# Patient Record
Sex: MALE | Race: BLACK OR AFRICAN AMERICAN | NOT HISPANIC OR LATINO | Employment: FULL TIME | URBAN - METROPOLITAN AREA
[De-identification: names, ages, dates, MRNs, and addresses within clinical notes are randomized per-mention and may not be internally consistent; named-entity substitution may affect disease eponyms.]

---

## 2017-01-24 ENCOUNTER — HOSPITAL ENCOUNTER (OUTPATIENT)
Dept: OTHER | Facility: HOSPITAL | Age: 62
Setting detail: SPECIMEN
Discharge: HOME | End: 2017-01-24
Attending: UROLOGY | Admitting: UROLOGY

## 2017-09-01 ENCOUNTER — APPOINTMENT (OUTPATIENT)
Dept: GENERAL RADIOLOGY | Facility: HOSPITAL | Age: 62
End: 2017-09-01

## 2017-09-01 ENCOUNTER — HOSPITAL ENCOUNTER (OUTPATIENT)
Dept: CARDIOLOGY | Facility: HOSPITAL | Age: 62
Discharge: HOME | End: 2017-09-01
Attending: INTERNAL MEDICINE

## 2017-09-01 ENCOUNTER — HOSPITAL ENCOUNTER (EMERGENCY)
Facility: HOSPITAL | Age: 62
Discharge: HOME | End: 2017-09-01
Attending: EMERGENCY MEDICINE | Admitting: EMERGENCY MEDICINE

## 2017-09-01 VITALS
WEIGHT: 170 LBS | DIASTOLIC BLOOD PRESSURE: 83 MMHG | RESPIRATION RATE: 16 BRPM | HEART RATE: 55 BPM | HEIGHT: 65 IN | BODY MASS INDEX: 28.32 KG/M2 | SYSTOLIC BLOOD PRESSURE: 126 MMHG | TEMPERATURE: 97.5 F | OXYGEN SATURATION: 94 %

## 2017-09-01 DIAGNOSIS — R07.9 CHEST PAIN, UNSPECIFIED TYPE: Primary | ICD-10-CM

## 2017-09-01 DIAGNOSIS — R07.2 PRECORDIAL PAIN: Primary | ICD-10-CM

## 2017-09-01 DIAGNOSIS — R07.2 PRECORDIAL PAIN: ICD-10-CM

## 2017-09-01 LAB
ALBUMIN SERPL-MCNC: 4.1 G/DL (ref 3.5–5.2)
ALBUMIN/GLOB SERPL: 1.1 G/DL
ALP SERPL-CCNC: 66 U/L (ref 39–117)
ALT SERPL W P-5'-P-CCNC: 19 U/L (ref 1–41)
ANION GAP SERPL CALCULATED.3IONS-SCNC: 16.9 MMOL/L
AST SERPL-CCNC: 25 U/L (ref 1–40)
BASOPHILS # BLD AUTO: 0.04 10*3/MM3 (ref 0–0.2)
BASOPHILS NFR BLD AUTO: 0.6 % (ref 0–1.5)
BH CV ECHO MEAS - ACS: 1.7 CM
BH CV ECHO MEAS - AO MAX PG: 10.4 MMHG
BH CV ECHO MEAS - AO ROOT AREA (BSA CORRECTED): 1.6
BH CV ECHO MEAS - AO ROOT AREA: 6.8 CM^2
BH CV ECHO MEAS - AO ROOT DIAM: 2.9 CM
BH CV ECHO MEAS - AO V2 MAX: 161 CM/SEC
BH CV ECHO MEAS - BSA(HAYCOCK): 1.9 M^2
BH CV ECHO MEAS - BSA: 1.8 M^2
BH CV ECHO MEAS - BZI_BMI: 28.3 KILOGRAMS/M^2
BH CV ECHO MEAS - BZI_METRIC_HEIGHT: 165.1 CM
BH CV ECHO MEAS - BZI_METRIC_WEIGHT: 77.1 KG
BH CV ECHO MEAS - CONTRAST EF 4CH: 71.4 ML/M^2
BH CV ECHO MEAS - EDV(MOD-SP4): 56 ML
BH CV ECHO MEAS - EDV(TEICH): 108.8 ML
BH CV ECHO MEAS - EF(CUBED): 73.6 %
BH CV ECHO MEAS - EF(MOD-SP4): 64 %
BH CV ECHO MEAS - EF(TEICH): 65.3 %
BH CV ECHO MEAS - ESV(MOD-SP4): 16 ML
BH CV ECHO MEAS - ESV(TEICH): 37.8 ML
BH CV ECHO MEAS - FS: 35.9 %
BH CV ECHO MEAS - IVS/LVPW: 1
BH CV ECHO MEAS - IVSD: 1.1 CM
BH CV ECHO MEAS - LAT PEAK E' VEL: 10 CM/SEC
BH CV ECHO MEAS - LV DIASTOLIC VOL/BSA (35-75): 30.3 ML/M^2
BH CV ECHO MEAS - LV MASS(C)D: 183 GRAMS
BH CV ECHO MEAS - LV MASS(C)DI: 99.1 GRAMS/M^2
BH CV ECHO MEAS - LV SYSTOLIC VOL/BSA (12-30): 8.7 ML/M^2
BH CV ECHO MEAS - LVIDD: 4.8 CM
BH CV ECHO MEAS - LVIDS: 3.1 CM
BH CV ECHO MEAS - LVLD AP4: 7.7 CM
BH CV ECHO MEAS - LVLS AP4: 6.2 CM
BH CV ECHO MEAS - LVPWD: 1 CM
BH CV ECHO MEAS - MED PEAK E' VEL: 9 CM/SEC
BH CV ECHO MEAS - MR MAX PG: 42.1 MMHG
BH CV ECHO MEAS - MR MAX VEL: 324.4 CM/SEC
BH CV ECHO MEAS - MV A DUR: 0.13 SEC
BH CV ECHO MEAS - MV A MAX VEL: 120.3 CM/SEC
BH CV ECHO MEAS - MV DEC SLOPE: 481 CM/SEC^2
BH CV ECHO MEAS - MV DEC TIME: 0.21 SEC
BH CV ECHO MEAS - MV E MAX VEL: 99.4 CM/SEC
BH CV ECHO MEAS - MV E/A: 0.83
BH CV ECHO MEAS - MV P1/2T MAX VEL: 99.4 CM/SEC
BH CV ECHO MEAS - MV P1/2T: 60.6 MSEC
BH CV ECHO MEAS - MVA P1/2T LCG: 2.2 CM^2
BH CV ECHO MEAS - MVA(P1/2T): 3.6 CM^2
BH CV ECHO MEAS - PULM A REVS DUR: 0.11 SEC
BH CV ECHO MEAS - PULM A REVS VEL: 38.3 CM/SEC
BH CV ECHO MEAS - PULM DIAS VEL: 42.2 CM/SEC
BH CV ECHO MEAS - PULM S/D: 1.6
BH CV ECHO MEAS - PULM SYS VEL: 68.9 CM/SEC
BH CV ECHO MEAS - SI(CUBED): 44.8 ML/M^2
BH CV ECHO MEAS - SI(MOD-SP4): 21.7 ML/M^2
BH CV ECHO MEAS - SI(TEICH): 38.5 ML/M^2
BH CV ECHO MEAS - SV(CUBED): 82.7 ML
BH CV ECHO MEAS - SV(MOD-SP4): 40 ML
BH CV ECHO MEAS - SV(TEICH): 71.1 ML
BH CV ECHO MEAS - TAPSE (>1.6): 2 CM2
BH CV ECHO MEAS - TR MAX VEL: 291.8 CM/SEC
BH CV STRESS BP STAGE 1: NORMAL
BH CV STRESS BP STAGE 2: NORMAL
BH CV STRESS DURATION MIN STAGE 1: 3
BH CV STRESS DURATION MIN STAGE 2: 3
BH CV STRESS DURATION MIN STAGE 3: 1
BH CV STRESS DURATION SEC STAGE 1: 0
BH CV STRESS DURATION SEC STAGE 2: 0
BH CV STRESS DURATION SEC STAGE 3: 0
BH CV STRESS ECHO POST STRESS EJECTION FRACTION EF: 72 %
BH CV STRESS GRADE STAGE 1: 10
BH CV STRESS GRADE STAGE 2: 12
BH CV STRESS GRADE STAGE 3: 14
BH CV STRESS HR STAGE 1: 88
BH CV STRESS HR STAGE 2: 122
BH CV STRESS HR STAGE 3: 146
BH CV STRESS METS STAGE 1: 5
BH CV STRESS METS STAGE 2: 7.5
BH CV STRESS METS STAGE 3: 10
BH CV STRESS PROTOCOL 1: NORMAL
BH CV STRESS SPEED STAGE 1: 1.7
BH CV STRESS SPEED STAGE 2: 2.5
BH CV STRESS SPEED STAGE 3: 3.4
BH CV STRESS STAGE 1: 1
BH CV STRESS STAGE 2: 2
BH CV STRESS STAGE 3: 3
BH CV XLRA - RV BASE: 2.6 CM
BH CV XLRA - TDI S': 13 CM/SEC
BILIRUB SERPL-MCNC: 0.8 MG/DL (ref 0.1–1.2)
BUN BLD-MCNC: 13 MG/DL (ref 8–23)
BUN/CREAT SERPL: 11.2 (ref 7–25)
CALCIUM SPEC-SCNC: 9.4 MG/DL (ref 8.6–10.5)
CHLORIDE SERPL-SCNC: 102 MMOL/L (ref 98–107)
CO2 SERPL-SCNC: 19.1 MMOL/L (ref 22–29)
CREAT BLD-MCNC: 1.16 MG/DL (ref 0.76–1.27)
DEPRECATED RDW RBC AUTO: 45.6 FL (ref 37–54)
E/E' RATIO: 10.5
EOSINOPHIL # BLD AUTO: 0.1 10*3/MM3 (ref 0–0.7)
EOSINOPHIL NFR BLD AUTO: 1.5 % (ref 0.3–6.2)
ERYTHROCYTE [DISTWIDTH] IN BLOOD BY AUTOMATED COUNT: 15.4 % (ref 11.5–14.5)
GFR SERPL CREATININE-BSD FRML MDRD: 77 ML/MIN/1.73
GLOBULIN UR ELPH-MCNC: 3.8 GM/DL
GLUCOSE BLD-MCNC: 123 MG/DL (ref 65–99)
HCT VFR BLD AUTO: 43.2 % (ref 40.4–52.2)
HGB BLD-MCNC: 14.7 G/DL (ref 13.7–17.6)
IMM GRANULOCYTES # BLD: 0 10*3/MM3 (ref 0–0.03)
IMM GRANULOCYTES NFR BLD: 0 % (ref 0–0.5)
INR PPP: 1.03 (ref 0.9–1.1)
LEFT ATRIUM VOLUME INDEX: 24 ML/M2
LV EF 2D ECHO EST: 64 %
LYMPHOCYTES # BLD AUTO: 1.99 10*3/MM3 (ref 0.9–4.8)
LYMPHOCYTES NFR BLD AUTO: 29.1 % (ref 19.6–45.3)
MAXIMAL PREDICTED HEART RATE: 158 BPM
MCH RBC QN AUTO: 27.9 PG (ref 27–32.7)
MCHC RBC AUTO-ENTMCNC: 34 G/DL (ref 32.6–36.4)
MCV RBC AUTO: 82.1 FL (ref 79.8–96.2)
MONOCYTES # BLD AUTO: 0.46 10*3/MM3 (ref 0.2–1.2)
MONOCYTES NFR BLD AUTO: 6.7 % (ref 5–12)
NEUTROPHILS # BLD AUTO: 4.24 10*3/MM3 (ref 1.9–8.1)
NEUTROPHILS NFR BLD AUTO: 62.1 % (ref 42.7–76)
NRBC BLD MANUAL-RTO: 0 /100 WBC (ref 0–0)
PERCENT MAX PREDICTED HR: 92.41 %
PLAT MORPH BLD: NORMAL
PLATELET # BLD AUTO: 130 10*3/MM3 (ref 140–500)
PMV BLD AUTO: 10.8 FL (ref 6–12)
POTASSIUM BLD-SCNC: 4.4 MMOL/L (ref 3.5–5.2)
PROT SERPL-MCNC: 7.9 G/DL (ref 6–8.5)
PROTHROMBIN TIME: 13.1 SECONDS (ref 11.7–14.2)
RBC # BLD AUTO: 5.26 10*6/MM3 (ref 4.6–6)
RBC MORPH BLD: NORMAL
SODIUM BLD-SCNC: 138 MMOL/L (ref 136–145)
STRESS BASELINE BP: NORMAL MMHG
STRESS BASELINE HR: 55 BPM
STRESS PERCENT HR: 109 %
STRESS POST ESTIMATED WORKLOAD: 8 METS
STRESS POST EXERCISE DUR MIN: 7 MIN
STRESS POST EXERCISE DUR SEC: 0 SEC
STRESS POST PEAK BP: NORMAL MMHG
STRESS POST PEAK HR: 146 BPM
STRESS TARGET HR: 134 BPM
TROPONIN T SERPL-MCNC: <0.01 NG/ML (ref 0–0.03)
WBC MORPH BLD: NORMAL
WBC NRBC COR # BLD: 6.83 10*3/MM3 (ref 4.5–10.7)

## 2017-09-01 PROCEDURE — 85025 COMPLETE CBC W/AUTO DIFF WBC: CPT | Performed by: EMERGENCY MEDICINE

## 2017-09-01 PROCEDURE — 84484 ASSAY OF TROPONIN QUANT: CPT | Performed by: EMERGENCY MEDICINE

## 2017-09-01 PROCEDURE — 93325 DOPPLER ECHO COLOR FLOW MAPG: CPT

## 2017-09-01 PROCEDURE — 93016 CV STRESS TEST SUPVJ ONLY: CPT | Performed by: INTERNAL MEDICINE

## 2017-09-01 PROCEDURE — 85610 PROTHROMBIN TIME: CPT | Performed by: EMERGENCY MEDICINE

## 2017-09-01 PROCEDURE — 71020 HC CHEST PA AND LATERAL: CPT

## 2017-09-01 PROCEDURE — 93350 STRESS TTE ONLY: CPT

## 2017-09-01 PROCEDURE — 80053 COMPREHEN METABOLIC PANEL: CPT | Performed by: EMERGENCY MEDICINE

## 2017-09-01 PROCEDURE — 93350 STRESS TTE ONLY: CPT | Performed by: INTERNAL MEDICINE

## 2017-09-01 PROCEDURE — 93320 DOPPLER ECHO COMPLETE: CPT | Performed by: INTERNAL MEDICINE

## 2017-09-01 PROCEDURE — 93325 DOPPLER ECHO COLOR FLOW MAPG: CPT | Performed by: INTERNAL MEDICINE

## 2017-09-01 PROCEDURE — 99284 EMERGENCY DEPT VISIT MOD MDM: CPT | Performed by: INTERNAL MEDICINE

## 2017-09-01 PROCEDURE — 85007 BL SMEAR W/DIFF WBC COUNT: CPT | Performed by: EMERGENCY MEDICINE

## 2017-09-01 PROCEDURE — 93320 DOPPLER ECHO COMPLETE: CPT

## 2017-09-01 PROCEDURE — 93018 CV STRESS TEST I&R ONLY: CPT | Performed by: INTERNAL MEDICINE

## 2017-09-01 PROCEDURE — 99285 EMERGENCY DEPT VISIT HI MDM: CPT

## 2017-09-01 PROCEDURE — 93005 ELECTROCARDIOGRAM TRACING: CPT

## 2017-09-01 PROCEDURE — 93017 CV STRESS TEST TRACING ONLY: CPT

## 2017-09-01 PROCEDURE — 93010 ELECTROCARDIOGRAM REPORT: CPT | Performed by: INTERNAL MEDICINE

## 2017-09-01 RX ORDER — NITROGLYCERIN 0.4 MG/1
0.4 TABLET SUBLINGUAL
Status: DISCONTINUED | OUTPATIENT
Start: 2017-09-01 | End: 2017-09-01 | Stop reason: HOSPADM

## 2017-09-01 RX ORDER — SODIUM CHLORIDE 0.9 % (FLUSH) 0.9 %
10 SYRINGE (ML) INJECTION AS NEEDED
Status: DISCONTINUED | OUTPATIENT
Start: 2017-09-01 | End: 2017-09-01 | Stop reason: HOSPADM

## 2017-09-01 RX ORDER — ASPIRIN 81 MG/1
324 TABLET, CHEWABLE ORAL ONCE
Status: COMPLETED | OUTPATIENT
Start: 2017-09-01 | End: 2017-09-01

## 2017-09-01 RX ORDER — SULFAMETHOXAZOLE AND TRIMETHOPRIM 800; 160 MG/1; MG/1
1 TABLET ORAL 2 TIMES DAILY
COMMUNITY
End: 2017-09-13

## 2017-09-01 RX ORDER — OXYBUTYNIN CHLORIDE 5 MG/1
5 TABLET ORAL DAILY
COMMUNITY
End: 2020-05-19 | Stop reason: SDUPTHER

## 2017-09-01 RX ADMIN — NITROGLYCERIN 0.4 MG: 0.4 TABLET SUBLINGUAL at 08:43

## 2017-09-01 RX ADMIN — ASPIRIN 81 MG 324 MG: 81 TABLET ORAL at 08:36

## 2017-09-01 RX ADMIN — NITROGLYCERIN 0.4 MG: 0.4 TABLET SUBLINGUAL at 08:36

## 2017-09-01 NOTE — H&P
Patient Name: Chet Boothe  :1955  62 y.o.    Date of Admission: 2017  Date of Consultation:  17  Encounter Provider: Kerry Mary RN  Place of Service: [facility]  Referring Provider: No ref. provider found  Patient Care Team:  Luiz Morrison MD as PCP - General      Chief complaint: chest pain    History of Present Illness:.   62-year-old -American male usually is followed by Dr. Morrison.  Patient presents to the emergency room complaining of chest discomfort.  He says it started in the front of his chest and radiated directly into his back.  He said actually started about 2 weeks ago.  He's been having intermittent episodes since then.  Usually he says he sits down it goes away in several minutes but it does not always occur with exertion.  He has no associated symptoms of shortness of breath diaphoresis nausea.  He came emergency room today because last night it persisted and did not go away.  He said nitroglycerin did help it was a stabbing sensation.  Now he is down to 1 out of 10 x-ray comfortable resting comfortably.  To the best of my knowledge and per the patient's report he did not continue the morphine.  He was seen for further evaluation    Past Medical History:   Diagnosis Date   • Multiple lipomas    • Prostate CA        Past Surgical History:   Procedure Laterality Date   • EXCISION MASS TRUNK Bilateral 3/18/2016    Procedure: LIPOMA EXCISION, bilateral upper extremities and right flank region ;  Surgeon: Marco Lopez MD;  Location: [facility] OR [facility] [place];  Service:    • INGUINAL HERNIA REPAIR Right    • LIPOMA EXCISION      TWICE         Prior to Admission medications    Medication Sig Start Date End Date Taking? Authorizing Provider   cetirizine (ZyrTEC) 10 MG tablet Take 10 mg by mouth 2 (two) times a day as needed for allergies.   Yes Historical Provider, MD   HYDROcodone-acetaminophen (NORCO) 5-325 MG per tablet Take 2  tablets by mouth every 6 (six) hours as needed (1-2 tabs prn) for up to 25 doses. 9/16/16  Yes Luiz Morrison MD   oxybutynin (DITROPAN) 5 MG tablet Take 5 mg by mouth Daily.   Yes Historical Provider, MD   sulfamethoxazole-trimethoprim (BACTRIM DS,SEPTRA DS) 800-160 MG per tablet Take 1 tablet by mouth 2 (Two) Times a Day.   Yes Historical Provider, MD       No Known Allergies    Social History     Social History   • Marital status:      Spouse name: N/A   • Number of children: N/A   • Years of education: N/A     Social History Main Topics   • Smoking status: Never Smoker   • Smokeless tobacco: None   • Alcohol use No   • Drug use: No   • Sexual activity: Defer     Other Topics Concern   • None     Social History Narrative   • None       History reviewed. No pertinent family history.    REVIEW OF SYSTEMS:   All systems reviewed.  Pertinent positives identified in HPI.  All other systems are negative.      Objective:     Vitals:    09/01/17 0836 09/01/17 0841 09/01/17 0849 09/01/17 0859   BP: 139/89 133/86 133/80 115/72   Pulse: 54 53 51 53   Resp: 18 16 16 16   Temp:       SpO2: 99% 98% 99% 93%   Weight:       Height:         Body mass index is 28.29 kg/(m^2).    General Appearance:    Alert, cooperative, in no acute distress   Head:    Normocephalic, without obvious abnormality, atraumatic   Eyes:            Lids and lashes normal, conjunctivae and sclerae normal, no   icterus, no pallor, corneas clear, PERRLA   Ears:    Ears appear intact with no abnormalities noted   Throat:   No oral lesions, no thrush, oral mucosa moist   Neck:   No adenopathy, supple, trachea midline, no thyromegaly, no   carotid bruit, no JVD   Back:     No kyphosis present, no scoliosis present, no skin lesions, erythema or scars, no tenderness to percussion or palpation, range of motion normal   Lungs:     Clear to auscultation,respirations regular, even and unlabored    Heart:    Regular rhythm and normal rate, normal S1 and S2,  no murmur, no gallop, no rub, no click   Chest Wall:    No abnormalities observed   Abdomen:     Normal bowel sounds, no masses, no organomegaly, soft        non-tender, non-distended, no guarding, no rebound  tenderness   Extremities:   Moves all extremities well, no edema, no cyanosis, no redness   Pulses:   Pulses palpable and equal bilaterally. Normal radial, carotid, femoral, dorsalis pedis and posterior tibial pulses bilaterally. Normal abdominal aorta   Skin:  Psychiatric:   No bleeding, bruising or rash    Alert and oriented x 3, normal mood and affect   Lab Review:       Results from last 7 days  Lab Units 09/01/17  0708   SODIUM mmol/L 138   POTASSIUM mmol/L 4.4   CHLORIDE mmol/L 102   CO2 mmol/L 19.1*   BUN mg/dL 13   CREATININE mg/dL 1.16   CALCIUM mg/dL 9.4   BILIRUBIN mg/dL 0.8   ALK PHOS U/L 66   ALT (SGPT) U/L 19   AST (SGOT) U/L 25   GLUCOSE mg/dL 123*       Results from last 7 days  Lab Units 09/01/17  0708   TROPONIN T ng/mL <0.010       Results from last 7 days  Lab Units 09/01/17  0708   WBC 10*3/mm3 6.83   HEMOGLOBIN g/dL 14.7   HEMATOCRIT % 43.2   PLATELETS 10*3/mm3 130*       Results from last 7 days  Lab Units 09/01/17  0708   INR  1.03     Previous EKG 3/15/16                           I personally viewed and interpreted the patient's EKG/Telemetry data.        Assessment and Plan:    1.  Atypical chest discomfort.  There are obviously parts of the story that are very concerning the fact it's been recurrent over the past 2 weeks.  Has not occurred with exertion it is been random in nature.  Nitroglycerin did help but as I mentioned to the patient can also help with esophageal spasms.  He does not have a history of hypertension his blood pressure is stable.  His chest x-ray did not show any widening of the mediastinum and his EKG as noted above shows some nonspecific ST segment changes.  In light of that I'm when a start with a stress echocardiogram in my office.  If the stress echo is  unremarkable I'll place him on a proton pump in a better this could represent a esophageal spasm.  He does deny reflux symptoms he also denies any types a recent travels and denies shortness of breath.      Kerry Mary RN  09/01/17  9:44 AM    Jamie Redd MD

## 2017-09-01 NOTE — ED PROVIDER NOTES
EMERGENCY DEPARTMENT ENCOUNTER    CHIEF COMPLAINT  Chief Complaint: chest pain  History given by: pt  History limited by: N/A  Room Number:   PMD: Luiz Morrison MD      HPI:  Pt is a 62 y.o. male who presents complaining of sharp constant central chest pain that began at approximately 0000 last night. Pt claims the pain was a 9/10 when it began and is currently a 5/10. PT states he was driving when the pain began; pt denies doing any exertional activity that caused onset. Pt also c/o epigastric abdominal pain, dry cough, and dysuria. Pt has a hx of local prostate cancer. Pt denies a family hx of heart problems.       Duration/Onset/Timin day   Location: central chest  Radiation: None reported   Quality: sharp constant   Intensity/Severity: currently a 5/10, was a 9/10 when pain began   Associated Symptoms: epigastric abdominal pain, dry cough, dysuria   Aggravating or Alleviating Factors: None reported   Previous Episodes: None reported.       PAST MEDICAL HISTORY  Active Ambulatory Problems     Diagnosis Date Noted   • Multiple lipomas    • Pain medication agreement signed 2016     Resolved Ambulatory Problems     Diagnosis Date Noted   • No Resolved Ambulatory Problems     Past Medical History:   Diagnosis Date   • Multiple lipomas    • Prostate CA        PAST SURGICAL HISTORY  Past Surgical History:   Procedure Laterality Date   • EXCISION MASS TRUNK Bilateral 3/18/2016    Procedure: LIPOMA EXCISION, bilateral upper extremities and right flank region ;  Surgeon: Marco Lopez MD;  Location: [facility] OR [facility] [place];  Service:    • INGUINAL HERNIA REPAIR Right    • LIPOMA EXCISION      TWICE       FAMILY HISTORY  History reviewed. No pertinent family history.    SOCIAL HISTORY  Social History     Social History   • Marital status:      Spouse name: N/A   • Number of children: N/A   • Years of education: N/A     Occupational History   • Not on file.     Social History Main Topics   • Smoking  status: Never Smoker   • Smokeless tobacco: Not on file   • Alcohol use No   • Drug use: No   • Sexual activity: Defer     Other Topics Concern   • Not on file     Social History Narrative   • No narrative on file       ALLERGIES  Review of patient's allergies indicates no known allergies.    REVIEW OF SYSTEMS  Review of Systems   Constitutional: Negative.  Negative for chills and fever.   HENT: Negative.  Negative for sore throat.    Eyes: Negative.    Respiratory: Positive for cough (dry ). Negative for shortness of breath.    Cardiovascular: Positive for chest pain. Negative for leg swelling.   Gastrointestinal: Positive for abdominal pain (epigastric dull). Negative for diarrhea, nausea and vomiting.   Genitourinary: Positive for dysuria.   Musculoskeletal: Negative.  Negative for back pain.   Skin: Negative.  Negative for rash.   Neurological: Negative.  Negative for headaches.       PHYSICAL EXAM  ED Triage Vitals   Temp Heart Rate Resp BP SpO2   09/01/17 0601 09/01/17 0601 09/01/17 0603 09/01/17 0603 09/01/17 0601   97.6 °F (36.4 °C) 65 20 189/88 100 %      Temp src Heart Rate Source Patient Position BP Location FiO2 (%)   -- -- -- -- --              Physical Exam   Constitutional: No distress.   HENT:   Head: Normocephalic and atraumatic.   Mouth/Throat: Oropharynx is clear and moist.   Eyes:   Unremarkable   Cardiovascular: Normal rate and regular rhythm.    No murmur heard.  Pulmonary/Chest: Effort normal and breath sounds normal. No respiratory distress. He exhibits tenderness (to palpation mid-sternum ).   Abdominal: There is no tenderness.   Musculoskeletal: He exhibits no edema or tenderness (no lower extremity pain ).   Neurological: He is alert.   Skin: No rash noted.   Nursing note and vitals reviewed.      LAB RESULTS  Lab Results (last 24 hours)     Procedure Component Value Units Date/Time    CBC & Differential [[id number]] Collected:  09/01/17 0708    Specimen:  Blood Updated:  09/01/17 0802     Narrative:       The following orders were created for panel order CBC & Differential.  Procedure                               Abnormality         Status                     ---------                               -----------         ------                     Scan Slide[[id number]]                   Normal              Final result               CBC Auto Differential[[id number]]        Abnormal            Final result                 Please view results for these tests on the individual orders.    Comprehensive Metabolic Panel [[id number]]  (Abnormal) Collected:  09/01/17 0708    Specimen:  Blood Updated:  09/01/17 0816     Glucose 123 (H) mg/dL      BUN 13 mg/dL      Creatinine 1.16 mg/dL      Sodium 138 mmol/L      Potassium 4.4 mmol/L       Specimen hemolyzed.  Results may be affected. Release stat results on hemolyzed specimen per Dr. Wade.        Chloride 102 mmol/L      CO2 19.1 (L) mmol/L      Calcium 9.4 mg/dL      Total Protein 7.9 g/dL      Albumin 4.10 g/dL      ALT (SGPT) 19 U/L       Specimen hemolyzed.  Results may be affected.        AST (SGOT) 25 U/L       Specimen hemolyzed.  Results may be affected.        Alkaline Phosphatase 66 U/L      Total Bilirubin 0.8 mg/dL      eGFR   Amer 77 mL/min/1.73      Globulin 3.8 gm/dL      A/G Ratio 1.1 g/dL      BUN/Creatinine Ratio 11.2     Anion Gap 16.9 mmol/L     Protime-INR [[id number]]  (Normal) Collected:  09/01/17 0708    Specimen:  Blood Updated:  09/01/17 0748     Protime 13.1 Seconds      INR 1.03    Troponin [[id number]]  (Normal) Collected:  09/01/17 0708    Specimen:  Blood Updated:  09/01/17 0816     Troponin T <0.010 ng/mL       Specimen hemolyzed.  Results may be affected. Release stat results on hemolyzed specimen per Dr. Wade.       Narrative:       Troponin T Reference Ranges:  Less than 0.03 ng/mL:    Negative for AMI  0.03 to 0.09 ng/mL:      Indeterminant for AMI  Greater than 0.09 ng/mL: Positive for AMI    CBC Auto Differential  [[id number]]  (Abnormal) Collected:  09/01/17 0708    Specimen:  Blood Updated:  09/01/17 0802     WBC 6.83 10*3/mm3      RBC 5.26 10*6/mm3      Hemoglobin 14.7 g/dL      Hematocrit 43.2 %      MCV 82.1 fL      MCH 27.9 pg      MCHC 34.0 g/dL      RDW 15.4 (H) %      RDW-SD 45.6 fl      MPV 10.8 fL      Platelets 130 (L) 10*3/mm3      Neutrophil % 62.1 %      Lymphocyte % 29.1 %      Monocyte % 6.7 %      Eosinophil % 1.5 %      Basophil % 0.6 %      Immature Grans % 0.0 %      Neutrophils, Absolute 4.24 10*3/mm3      Lymphocytes, Absolute 1.99 10*3/mm3      Monocytes, Absolute 0.46 10*3/mm3      Eosinophils, Absolute 0.10 10*3/mm3      Basophils, Absolute 0.04 10*3/mm3      Immature Grans, Absolute 0.00 10*3/mm3      nRBC 0.0 /100 WBC     Scan Slide [[id number]]  (Normal) Collected:  09/01/17 0708    Specimen:  Blood Updated:  09/01/17 0802     RBC Morphology Normal     WBC Morphology Normal     Platelet Morphology Normal          I ordered the above labs and reviewed the results    RADIOLOGY  XR Chest 2 View   Final Result   FINDINGS:  1. Heart, mediastinum and prevascular vasculature show no change, no  acute process.  2. Mild spondylosis.        I ordered the above noted radiological studies. Interpreted by radiologist. Reviewed by me in PACS.       PROCEDURES  Procedures  EKG           EKG time: 0554  Rhythm/Rate: sinus 64  P waves and KS: normal  QRS, axis: normal   ST and T waves: Unremarkable     Interpreted Contemporaneously by me, independently viewed  Unchanged compared to prior 2016      PROGRESS AND CONSULTS  ED Course     0608: Triage ordered labs and Chest XR for further evaluation.     0739: Ordered Nitroglycerin and aspirin for chest pain.     0847: Rechecked pt and after administration of Nitroglycerin pt claims his pain is currently 2/10. Discussed lab and radiology results. Discussed stress test possibility. Recommend consulting cardiologist.     0929: Discussed pt's case with Dr. Redd  [cardiologist] who agrees to consult in ED.    1015: Discussed pt's case with Dr. Redd who recommends pt receive stress ECHO.     1017: Rechecked pt and discussed discharge plans and have pt f/u with cardiologist for a stress ECHO. PT understands and agrees to plan. All questions addressed at time.       MEDICAL DECISION MAKING  Results were reviewed/discussed with the patient and they were also made aware of online access. Pt also made aware that some labs, such as cultures, will not be resulted during ER visit and follow up with PMD is necessary.     MDM  Number of Diagnoses or Management Options  Chest pain, unspecified type:      Amount and/or Complexity of Data Reviewed  Clinical lab tests: ordered and reviewed (Labs unremarkable )  Tests in the radiology section of CPT®: ordered and reviewed (Chest XR FINDINGS:  1. Heart, mediastinum and prevascular vasculature show no change, no acute process.  2. Mild spondylosis.)  Tests in the medicine section of CPT®: ordered and reviewed (Refer to procedure. )  Discussion of test results with the performing providers: yes ( Dr. Redd (cardiologist))           DIAGNOSIS  Final diagnoses:   Chest pain, unspecified type       DISPOSITION  DISCHARGE    Patient discharged in stable condition.    Reviewed implications of results, diagnosis, meds, responsibility to follow up, warning signs and symptoms of possible worsening, potential complications and reasons to return to ER.    Patient/Family voiced understanding of above instructions.    Discussed plan for discharge, as there is no emergent indication for admission.  Pt/family is agreeable and understands need for follow up and repeat testing.  Pt is aware that discharge does not mean that nothing is wrong but it indicates no emergency is present that requires admission and they must continue care with follow-up as given below or physician of their choice.     FOLLOW-UP  No follow-up provider specified.       Medication  List      Notice     No changes were made to your prescriptions during this visit.            Latest Documented Vital Signs:  As of 10:20 AM  BP- 115/72 HR- 53 Temp- 97.6 °F (36.4 °C) O2 sat- 93%    --  Documentation assistance provided by ila Taylor for Dr. Wade.  Information recorded by the scribe was done at my direction and has been verified and validated by me.     José Antonio Taylor  09/01/17 0944       José Antonio Taylor  09/01/17 1020       Galo Wade MD  09/01/17 1024

## 2017-09-13 ENCOUNTER — OFFICE VISIT (OUTPATIENT)
Dept: FAMILY MEDICINE CLINIC | Facility: CLINIC | Age: 62
End: 2017-09-13

## 2017-09-13 VITALS
SYSTOLIC BLOOD PRESSURE: 110 MMHG | HEIGHT: 65 IN | OXYGEN SATURATION: 96 % | DIASTOLIC BLOOD PRESSURE: 70 MMHG | RESPIRATION RATE: 15 BRPM | BODY MASS INDEX: 28.99 KG/M2 | HEART RATE: 67 BPM | TEMPERATURE: 98.4 F | WEIGHT: 174 LBS

## 2017-09-13 DIAGNOSIS — C61 PROSTATE CA (HCC): Primary | ICD-10-CM

## 2017-09-13 DIAGNOSIS — E78.5 DYSLIPIDEMIA: ICD-10-CM

## 2017-09-13 DIAGNOSIS — D17.9 MULTIPLE LIPOMAS: ICD-10-CM

## 2017-09-13 PROCEDURE — 99213 OFFICE O/P EST LOW 20 MIN: CPT | Performed by: INTERNAL MEDICINE

## 2017-09-13 RX ORDER — FLUTICASONE PROPIONATE 50 MCG
2 SPRAY, SUSPENSION (ML) NASAL DAILY
Qty: 1 BOTTLE | Refills: 3 | Status: SHIPPED | OUTPATIENT
Start: 2017-09-13 | End: 2018-08-30 | Stop reason: SDUPTHER

## 2017-09-13 NOTE — PROGRESS NOTES
Christi Boothe is a 62 y.o. male.     History of Present Illness   Patient is being seen for prostate cancer.  He is going to receive hormone and radiation therapy.  Patient wants surgery at this time.  She was discussed with his urologist the need for treatment for osteoporosis.  A she will follow-up again in one month    Much of this encounter note is an electronic transcription/translation of spoken language to printed text.  The electronic translation of spoken language may permit erroneous, or at times, nonsensical words or phrases to be inadvertently transcribed.  Although I have reviewed the note for such errors, some may still exist.  The following portions of the patient's history were reviewed and updated as appropriate: allergies, current medications, past family history, past medical history, past social history, past surgical history and problem list.    Review of Systems   Constitutional: Negative for fatigue and fever.   HENT: Positive for congestion. Negative for trouble swallowing.    Eyes: Negative for discharge and visual disturbance.   Respiratory: Negative for choking and shortness of breath.    Cardiovascular: Negative for chest pain and palpitations.   Gastrointestinal: Negative for abdominal pain and blood in stool.   Endocrine: Negative.    Genitourinary: Negative for genital sores and hematuria.   Musculoskeletal: Negative for gait problem and joint swelling.   Skin: Negative for color change, pallor, rash and wound.   Allergic/Immunologic: Positive for environmental allergies. Negative for immunocompromised state.   Neurological: Negative for facial asymmetry and speech difficulty.   Psychiatric/Behavioral: Negative for hallucinations and suicidal ideas.       Objective   Physical Exam   Constitutional: He is oriented to person, place, and time. He appears well-developed and well-nourished.   HENT:   Head: Normocephalic.   Eyes: Conjunctivae are normal. Pupils are equal,  round, and reactive to light.   Neck: Normal range of motion. Neck supple.   Cardiovascular: Normal rate, regular rhythm and normal heart sounds.    Pulmonary/Chest: Effort normal and breath sounds normal.   Abdominal: Soft. Bowel sounds are normal.   Musculoskeletal: Normal range of motion.   Neurological: He is alert and oriented to person, place, and time.   Skin: Skin is warm and dry.   Psychiatric: He has a normal mood and affect. His behavior is normal. Judgment and thought content normal.   Nursing note and vitals reviewed.      Assessment/Plan   Problems Addressed this Visit        Genitourinary    Prostate CA - Primary    Relevant Orders    CBC & Differential    Comprehensive Metabolic Panel    Lipid Panel       Other    Multiple lipomas    Relevant Orders    CBC & Differential    Comprehensive Metabolic Panel    Lipid Panel    Dyslipidemia    Relevant Orders    CBC & Differential    Comprehensive Metabolic Panel    Lipid Panel

## 2017-09-18 ENCOUNTER — LAB (OUTPATIENT)
Dept: FAMILY MEDICINE CLINIC | Facility: CLINIC | Age: 62
End: 2017-09-18

## 2017-09-18 DIAGNOSIS — E78.5 DYSLIPIDEMIA: ICD-10-CM

## 2017-09-18 DIAGNOSIS — C61 PROSTATE CA (HCC): ICD-10-CM

## 2017-09-18 DIAGNOSIS — D17.9 MULTIPLE LIPOMAS: ICD-10-CM

## 2017-09-18 LAB
ALBUMIN SERPL-MCNC: 4.4 G/DL (ref 3.5–5.2)
ALBUMIN/GLOB SERPL: 1.6 G/DL
ALP SERPL-CCNC: 66 U/L (ref 39–117)
ALT SERPL W P-5'-P-CCNC: 14 U/L (ref 1–41)
ANION GAP SERPL CALCULATED.3IONS-SCNC: 11.8 MMOL/L
AST SERPL-CCNC: 16 U/L (ref 1–40)
BILIRUB SERPL-MCNC: 0.8 MG/DL (ref 0.1–1.2)
BUN BLD-MCNC: 11 MG/DL (ref 8–23)
BUN/CREAT SERPL: 9.2 (ref 7–25)
CALCIUM SPEC-SCNC: 9.9 MG/DL (ref 8.6–10.5)
CHLORIDE SERPL-SCNC: 106 MMOL/L (ref 98–107)
CHOLEST SERPL-MCNC: 195 MG/DL (ref 0–200)
CO2 SERPL-SCNC: 26.2 MMOL/L (ref 22–29)
CREAT BLD-MCNC: 1.19 MG/DL (ref 0.76–1.27)
ERYTHROCYTE [DISTWIDTH] IN BLOOD BY AUTOMATED COUNT: 14.4 % (ref 4.5–15)
GFR SERPL CREATININE-BSD FRML MDRD: 75 ML/MIN/1.73
GLOBULIN UR ELPH-MCNC: 2.7 GM/DL
GLUCOSE BLD-MCNC: 102 MG/DL (ref 65–99)
HCT VFR BLD AUTO: 44.6 % (ref 35–60)
HDLC SERPL-MCNC: 47 MG/DL (ref 40–60)
HGB BLD-MCNC: 14.2 G/DL (ref 13.5–18)
LDLC SERPL CALC-MCNC: 131 MG/DL (ref 0–100)
LDLC/HDLC SERPL: 2.79 {RATIO}
LYMPHOCYTES # BLD AUTO: 3.2 10*3/MM3 (ref 1.2–3.4)
LYMPHOCYTES NFR BLD AUTO: 32 % (ref 21–51)
MCH RBC QN AUTO: 26.4 PG (ref 26.1–33.1)
MCHC RBC AUTO-ENTMCNC: 31.8 G/DL (ref 33–37)
MCV RBC AUTO: 83 FL (ref 80–99)
MONOCYTES # BLD AUTO: 0.4 10*3/MM3 (ref 0.1–0.6)
MONOCYTES NFR BLD AUTO: 3.8 % (ref 2–9)
NEUTROPHILS # BLD AUTO: 6.4 10*3/MM3 (ref 1.4–6.5)
NEUTROPHILS NFR BLD AUTO: 64.2 % (ref 42–75)
PLATELET # BLD AUTO: 210 10*3/MM3 (ref 150–450)
PMV BLD AUTO: 9 FL (ref 7.1–10.5)
POTASSIUM BLD-SCNC: 4.4 MMOL/L (ref 3.5–5.2)
PROT SERPL-MCNC: 7.1 G/DL (ref 6–8.5)
RBC # BLD AUTO: 5.37 10*6/MM3 (ref 4–6)
SODIUM BLD-SCNC: 144 MMOL/L (ref 136–145)
TRIGL SERPL-MCNC: 84 MG/DL (ref 0–150)
VLDLC SERPL-MCNC: 16.8 MG/DL (ref 5–40)
WBC NRBC COR # BLD: 9.9 10*3/MM3 (ref 4.5–10)

## 2017-09-18 PROCEDURE — 36415 COLL VENOUS BLD VENIPUNCTURE: CPT | Performed by: INTERNAL MEDICINE

## 2017-09-18 PROCEDURE — 85025 COMPLETE CBC W/AUTO DIFF WBC: CPT | Performed by: INTERNAL MEDICINE

## 2017-09-18 PROCEDURE — 80061 LIPID PANEL: CPT | Performed by: INTERNAL MEDICINE

## 2017-09-18 PROCEDURE — 80053 COMPREHEN METABOLIC PANEL: CPT | Performed by: INTERNAL MEDICINE

## 2017-09-25 ENCOUNTER — TELEPHONE (OUTPATIENT)
Dept: FAMILY MEDICINE CLINIC | Facility: CLINIC | Age: 62
End: 2017-09-25

## 2017-09-25 NOTE — TELEPHONE ENCOUNTER
LDL a little elevated 131 recommend low-cholesterol diet low impact exercise 30 minutes 3-4 times a week.

## 2018-08-22 ENCOUNTER — TELEPHONE (OUTPATIENT)
Dept: FAMILY MEDICINE CLINIC | Facility: CLINIC | Age: 63
End: 2018-08-22

## 2018-08-22 ENCOUNTER — OFFICE VISIT (OUTPATIENT)
Dept: FAMILY MEDICINE CLINIC | Facility: CLINIC | Age: 63
End: 2018-08-22

## 2018-08-22 ENCOUNTER — HOSPITAL ENCOUNTER (OUTPATIENT)
Dept: CARDIOLOGY | Facility: HOSPITAL | Age: 63
Discharge: HOME | End: 2018-08-22
Admitting: INTERNAL MEDICINE

## 2018-08-22 VITALS
HEIGHT: 65 IN | SYSTOLIC BLOOD PRESSURE: 136 MMHG | HEART RATE: 79 BPM | OXYGEN SATURATION: 96 % | DIASTOLIC BLOOD PRESSURE: 89 MMHG | RESPIRATION RATE: 18 BRPM | BODY MASS INDEX: 29.16 KG/M2 | WEIGHT: 175 LBS

## 2018-08-22 VITALS
WEIGHT: 175 LBS | RESPIRATION RATE: 16 BRPM | TEMPERATURE: 98.7 F | BODY MASS INDEX: 29.16 KG/M2 | HEART RATE: 86 BPM | SYSTOLIC BLOOD PRESSURE: 140 MMHG | DIASTOLIC BLOOD PRESSURE: 88 MMHG | OXYGEN SATURATION: 98 % | HEIGHT: 65 IN

## 2018-08-22 DIAGNOSIS — R07.9 CHEST PAIN, UNSPECIFIED TYPE: Primary | ICD-10-CM

## 2018-08-22 DIAGNOSIS — C61 PROSTATE CA (HCC): ICD-10-CM

## 2018-08-22 DIAGNOSIS — E78.5 DYSLIPIDEMIA: ICD-10-CM

## 2018-08-22 DIAGNOSIS — R94.31 ABNORMAL ECG: ICD-10-CM

## 2018-08-22 DIAGNOSIS — Z00.00 PHYSICAL EXAM, ANNUAL: Primary | ICD-10-CM

## 2018-08-22 DIAGNOSIS — R00.2 PALPITATIONS: ICD-10-CM

## 2018-08-22 DIAGNOSIS — R32 URINARY INCONTINENCE, UNSPECIFIED TYPE: ICD-10-CM

## 2018-08-22 PROBLEM — J30.2 CHRONIC SEASONAL ALLERGIC RHINITIS: Status: ACTIVE | Noted: 2018-08-22

## 2018-08-22 LAB
25(OH)D3 SERPL-MCNC: 14 NG/ML (ref 30–100)
ALBUMIN SERPL-MCNC: 4.7 G/DL (ref 3.5–5.2)
ALBUMIN SERPL-MCNC: 5 G/DL (ref 3.5–5.2)
ALBUMIN/GLOB SERPL: 1.2 G/DL
ALBUMIN/GLOB SERPL: 1.4 G/DL
ALP SERPL-CCNC: 85 U/L (ref 39–117)
ALP SERPL-CCNC: 87 U/L (ref 39–117)
ALT SERPL W P-5'-P-CCNC: 19 U/L (ref 1–41)
ALT SERPL W P-5'-P-CCNC: 23 U/L (ref 1–41)
ANION GAP SERPL CALCULATED.3IONS-SCNC: 9 MMOL/L
ANION GAP SERPL CALCULATED.3IONS-SCNC: 9.9 MMOL/L
AST SERPL-CCNC: 19 U/L (ref 1–40)
AST SERPL-CCNC: 24 U/L (ref 1–40)
BASOPHILS # BLD AUTO: 0.03 10*3/MM3 (ref 0–0.2)
BASOPHILS NFR BLD AUTO: 0.5 % (ref 0–1.5)
BILIRUB SERPL-MCNC: 0.5 MG/DL (ref 0.1–1.2)
BILIRUB SERPL-MCNC: 0.6 MG/DL (ref 0.1–1.2)
BUN BLD-MCNC: 12 MG/DL (ref 8–23)
BUN BLD-MCNC: 12 MG/DL (ref 8–23)
BUN/CREAT SERPL: 10.7 (ref 7–25)
BUN/CREAT SERPL: 9.8 (ref 7–25)
CALCIUM SPEC-SCNC: 9.5 MG/DL (ref 8.6–10.5)
CALCIUM SPEC-SCNC: 9.9 MG/DL (ref 8.6–10.5)
CHLORIDE SERPL-SCNC: 105 MMOL/L (ref 98–107)
CHLORIDE SERPL-SCNC: 105 MMOL/L (ref 98–107)
CHOLEST SERPL-MCNC: 238 MG/DL (ref 0–200)
CO2 SERPL-SCNC: 27 MMOL/L (ref 22–29)
CO2 SERPL-SCNC: 27.1 MMOL/L (ref 22–29)
CREAT BLD-MCNC: 1.12 MG/DL (ref 0.76–1.27)
CREAT BLD-MCNC: 1.22 MG/DL (ref 0.76–1.27)
D DIMER PPP FEU-MCNC: 0.47 MCGFEU/ML (ref 0–0.49)
DEPRECATED RDW RBC AUTO: 41.3 FL (ref 37–54)
EOSINOPHIL # BLD AUTO: 0.02 10*3/MM3 (ref 0–0.7)
EOSINOPHIL NFR BLD AUTO: 0.3 % (ref 0.3–6.2)
ERYTHROCYTE [DISTWIDTH] IN BLOOD BY AUTOMATED COUNT: 14.4 % (ref 11.5–14.5)
ERYTHROCYTE [DISTWIDTH] IN BLOOD BY AUTOMATED COUNT: 14.4 % (ref 4.5–15)
GFR SERPL CREATININE-BSD FRML MDRD: 73 ML/MIN/1.73
GFR SERPL CREATININE-BSD FRML MDRD: 80 ML/MIN/1.73
GLOBULIN UR ELPH-MCNC: 3.5 GM/DL
GLOBULIN UR ELPH-MCNC: 4 GM/DL
GLUCOSE BLD-MCNC: 100 MG/DL (ref 65–99)
GLUCOSE BLD-MCNC: 93 MG/DL (ref 65–99)
HCT VFR BLD AUTO: 47.8 % (ref 35–60)
HCT VFR BLD AUTO: 49.6 % (ref 40.4–52.2)
HDLC SERPL-MCNC: 50 MG/DL (ref 40–60)
HGB BLD-MCNC: 16.3 G/DL (ref 13.5–18)
HGB BLD-MCNC: 16.9 G/DL (ref 13.7–17.6)
IMM GRANULOCYTES # BLD: 0 10*3/MM3 (ref 0–0.03)
IMM GRANULOCYTES NFR BLD: 0 % (ref 0–0.5)
LDLC SERPL CALC-MCNC: 171 MG/DL (ref 0–100)
LDLC/HDLC SERPL: 3.41 {RATIO}
LYMPHOCYTES # BLD AUTO: 1.57 10*3/MM3 (ref 0.9–4.8)
LYMPHOCYTES # BLD AUTO: 1.7 10*3/MM3 (ref 1.2–3.4)
LYMPHOCYTES NFR BLD AUTO: 24.4 % (ref 19.6–45.3)
LYMPHOCYTES NFR BLD AUTO: 25.9 % (ref 21–51)
MCH RBC QN AUTO: 27 PG (ref 27–32.7)
MCH RBC QN AUTO: 27.3 PG (ref 26.1–33.1)
MCHC RBC AUTO-ENTMCNC: 34.1 G/DL (ref 32.6–36.4)
MCHC RBC AUTO-ENTMCNC: 34.1 G/DL (ref 33–37)
MCV RBC AUTO: 79.2 FL (ref 79.8–96.2)
MCV RBC AUTO: 80.1 FL (ref 80–99)
MONOCYTES # BLD AUTO: 0.25 10*3/MM3 (ref 0.2–1.2)
MONOCYTES # BLD AUTO: 0.5 10*3/MM3 (ref 0.1–0.6)
MONOCYTES NFR BLD AUTO: 3.9 % (ref 5–12)
MONOCYTES NFR BLD AUTO: 7 % (ref 2–9)
NEUTROPHILS # BLD AUTO: 4.4 10*3/MM3 (ref 1.4–6.5)
NEUTROPHILS # BLD AUTO: 4.57 10*3/MM3 (ref 1.9–8.1)
NEUTROPHILS NFR BLD AUTO: 67.1 % (ref 42–75)
NEUTROPHILS NFR BLD AUTO: 70.9 % (ref 42.7–76)
NRBC BLD MANUAL-RTO: 0 /100 WBC (ref 0–0)
NT-PROBNP SERPL-MCNC: 6.3 PG/ML (ref 0–900)
PLATELET # BLD AUTO: 252 10*3/MM3 (ref 140–500)
PLATELET # BLD AUTO: 257 10*3/MM3 (ref 150–450)
PMV BLD AUTO: 10 FL (ref 6–12)
PMV BLD AUTO: 8.6 FL (ref 7.1–10.5)
POTASSIUM BLD-SCNC: 4 MMOL/L (ref 3.5–5.2)
POTASSIUM BLD-SCNC: 4 MMOL/L (ref 3.5–5.2)
PROT SERPL-MCNC: 8.5 G/DL (ref 6–8.5)
PROT SERPL-MCNC: 8.7 G/DL (ref 6–8.5)
RBC # BLD AUTO: 5.96 10*6/MM3 (ref 4–6)
RBC # BLD AUTO: 6.26 10*6/MM3 (ref 4.6–6)
SODIUM BLD-SCNC: 141 MMOL/L (ref 136–145)
SODIUM BLD-SCNC: 142 MMOL/L (ref 136–145)
TRIGL SERPL-MCNC: 87 MG/DL (ref 0–150)
TROPONIN T SERPL-MCNC: <0.01 NG/ML (ref 0–0.03)
VLDLC SERPL-MCNC: 17.4 MG/DL (ref 5–40)
WBC NRBC COR # BLD: 6.44 10*3/MM3 (ref 4.5–10.7)
WBC NRBC COR # BLD: 6.6 10*3/MM3 (ref 4.5–10)

## 2018-08-22 PROCEDURE — 82306 VITAMIN D 25 HYDROXY: CPT | Performed by: INTERNAL MEDICINE

## 2018-08-22 PROCEDURE — 84484 ASSAY OF TROPONIN QUANT: CPT | Performed by: INTERNAL MEDICINE

## 2018-08-22 PROCEDURE — 99214 OFFICE O/P EST MOD 30 MIN: CPT | Performed by: INTERNAL MEDICINE

## 2018-08-22 PROCEDURE — 83880 ASSAY OF NATRIURETIC PEPTIDE: CPT | Performed by: INTERNAL MEDICINE

## 2018-08-22 PROCEDURE — 99396 PREV VISIT EST AGE 40-64: CPT | Performed by: INTERNAL MEDICINE

## 2018-08-22 PROCEDURE — 80053 COMPREHEN METABOLIC PANEL: CPT | Performed by: INTERNAL MEDICINE

## 2018-08-22 PROCEDURE — 96374 THER/PROPH/DIAG INJ IV PUSH: CPT

## 2018-08-22 PROCEDURE — 36415 COLL VENOUS BLD VENIPUNCTURE: CPT

## 2018-08-22 PROCEDURE — 85025 COMPLETE CBC W/AUTO DIFF WBC: CPT | Performed by: INTERNAL MEDICINE

## 2018-08-22 PROCEDURE — 36415 COLL VENOUS BLD VENIPUNCTURE: CPT | Performed by: INTERNAL MEDICINE

## 2018-08-22 PROCEDURE — 94760 N-INVAS EAR/PLS OXIMETRY 1: CPT

## 2018-08-22 PROCEDURE — 85379 FIBRIN DEGRADATION QUANT: CPT | Performed by: INTERNAL MEDICINE

## 2018-08-22 PROCEDURE — 93000 ELECTROCARDIOGRAM COMPLETE: CPT | Performed by: INTERNAL MEDICINE

## 2018-08-22 PROCEDURE — 93005 ELECTROCARDIOGRAM TRACING: CPT | Performed by: INTERNAL MEDICINE

## 2018-08-22 PROCEDURE — 80061 LIPID PANEL: CPT | Performed by: INTERNAL MEDICINE

## 2018-08-22 RX ORDER — NITROGLYCERIN 0.4 MG/1
0.4 TABLET SUBLINGUAL
Status: DISCONTINUED | OUTPATIENT
Start: 2018-08-22 | End: 2018-08-30

## 2018-08-22 RX ORDER — SODIUM CHLORIDE 0.9 % (FLUSH) 0.9 %
10 SYRINGE (ML) INJECTION AS NEEDED
Status: DISCONTINUED | OUTPATIENT
Start: 2018-08-22 | End: 2018-08-30

## 2018-08-22 NOTE — PROGRESS NOTES
Procedure     ECG 12 Lead  Date/Time: 8/22/2018 1:08 PM  Performed by: GARRETT PRECIADO  Authorized by: GARRETT PRECIADO   Comparison: not compared with previous ECG   Rhythm: sinus bradycardia  Rate: bradycardic  ST Elevation: I, II and aVL  T depression: V1, V2, V3 and V4  QRS axis: left  Other: no other findings  Clinical impression: abnormal ECG  Comments: EKG Interpretation Report    Heart rate:    65 beats/min, IL interval:  141 msec, QRS duration:  93 msec  QTu:350 msec, QTc:  361   msec

## 2018-08-22 NOTE — PROGRESS NOTES
Date of Office Visit: 18  Encounter Provider: Laureano Power MD  Place of Service: [facility] CARDIOLOGY  Patient Name: Chet Boothe  :1955  Referral Provider:Luiz Morrison MD      Chief Complaint   Patient presents with   • Chest Pain     Chest pain     Mr Boothe is a 64 yo gentleman who has a history of chronic pain, hyperlipidemia, and an abnormal ECG.  In 2017 he presented to [facility] in ER with atypical chest pain had an ECG that was abnormal with some T wave abnormality at that time he underwent a stress echocardiogram that was normal.  He then went to his PCP today the patient states just to get a blood test done at the comminuted that yesterday he was having some chest pain and was lightheaded.  An EKG which again showed some of those T-wave inversions which when compared to his last ECG appeared new but when compared to previous ones unchanged he came to our chest pain unit when he arrived there he was asymptomatic that on starting his IV he became lightheaded blood pressure went to 70 heart rate in the 40s and became near syncopal.  He responded to atropine and fluids.  He denies any real recurrent syncopal spells, no paralysis, paresthesia, or dysarthria.  No orthopnea or PND.  Currently he feels fine.          Past Medical History:   Diagnosis Date   • Multiple lipomas    • Prostate CA (CMS/HCC)          Past Surgical History:   Procedure Laterality Date   • CHOLECYSTECTOMY     • EXCISION MASS TRUNK Bilateral 3/18/2016    Procedure: LIPOMA EXCISION, bilateral upper extremities and right flank region ;  Surgeon: Marco Lopez MD;  Location: [facility] OR [facility];  Service:    • INGUINAL HERNIA REPAIR Right    • LIPOMA EXCISION      TWICE         Current Outpatient Prescriptions on File Prior to Encounter   Medication Sig Dispense Refill   • cetirizine (ZyrTEC) 10 MG tablet Take 10 mg by mouth 2 (two) times a day as needed for  allergies.     • fluticasone (FLONASE) 50 MCG/ACT nasal spray 2 sprays into each nostril Daily. 1 bottle 3   • HYDROcodone-acetaminophen (NORCO) 5-325 MG per tablet Take 2 tablets by mouth every 6 (six) hours as needed (1-2 tabs prn) for up to 25 doses. 25 tablet 0   • oxybutynin (DITROPAN) 5 MG tablet Take 5 mg by mouth Daily.       No current facility-administered medications on file prior to encounter.          Social History     Social History   • Marital status:      Spouse name: N/A   • Number of children: N/A   • Years of education: N/A     Occupational History   • Not on file.     Social History Main Topics   • Smoking status: Never Smoker   • Smokeless tobacco: Not on file   • Alcohol use No   • Drug use: No   • Sexual activity: Defer     Other Topics Concern   • Not on file     Social History Narrative   • No narrative on file       Family History   Problem Relation Age of Onset   • Heart attack Father          Review of Systems   Constitution: Negative for decreased appetite, diaphoresis, fever, weakness, malaise/fatigue, weight gain and weight loss.   HENT: Negative for congestion, hearing loss, nosebleeds and tinnitus.    Eyes: Negative for blurred vision, double vision, vision loss in left eye, vision loss in right eye and visual disturbance.   Cardiovascular:        As noted in HPI   Respiratory:        As noted HPI   Endocrine: Negative for cold intolerance and heat intolerance.   Hematologic/Lymphatic: Negative for bleeding problem. Does not bruise/bleed easily.   Skin: Negative for color change, flushing, itching and rash.   Musculoskeletal: Negative for arthritis, back pain, joint pain, joint swelling, muscle weakness and myalgias.   Gastrointestinal: Negative for bloating, abdominal pain, constipation, diarrhea, dysphagia, heartburn, hematemesis, hematochezia, melena, nausea and vomiting.   Genitourinary: Negative for bladder incontinence, dysuria, frequency, nocturia and urgency.  "  Neurological: Positive for dizziness and light-headedness. Negative for focal weakness, headaches, loss of balance, numbness, paresthesias and vertigo.   Psychiatric/Behavioral: Negative for depression, memory loss and substance abuse.       Procedures      ECG 12 Lead  Date/Time: 8/22/2018 12:30 PM  Performed by: DAINA BHATT  Authorized by: DAINA BHATT   Comparison: compared with previous ECG   Similar to previous ECG  Rhythm: sinus rhythm  Rate: normal  QRS axis: normal                  Objective:    /92 (BP Location: Left arm, Patient Position: Sitting) Comment (Patient Position): reclined in cardiac recliner  Pulse 82   Resp 18   Ht 165.1 cm (65\")   Wt 79.4 kg (175 lb)   SpO2 98%   BMI 29.12 kg/m²        Physical Exam  Physical Exam   Constitutional: He is oriented to person, place, and time. He appears well-developed and well-nourished. No distress.   HENT:   Head: Normocephalic.   Eyes: Pupils are equal, round, and reactive to light. Conjunctivae are normal. No scleral icterus.   Neck: Normal carotid pulses, no hepatojugular reflux and no JVD present. Carotid bruit is not present. No tracheal deviation, no edema and no erythema present. No thyromegaly present.   Cardiovascular: Normal rate, regular rhythm, S1 normal, S2 normal, normal heart sounds and intact distal pulses.   No extrasystoles are present. PMI is not displaced.  Exam reveals no gallop, no distant heart sounds and no friction rub.    No murmur heard.  Pulses:       Carotid pulses are 2+ on the right side, and 2+ on the left side.       Radial pulses are 2+ on the right side, and 2+ on the left side.        Femoral pulses are 2+ on the right side, and 2+ on the left side.       Dorsalis pedis pulses are 2+ on the right side, and 2+ on the left side.        Posterior tibial pulses are 2+ on the right side, and 2+ on the left side.   Pulmonary/Chest: Effort normal and breath sounds normal. No respiratory distress. He has no " decreased breath sounds. He has no wheezes. He has no rhonchi. He has no rales. He exhibits no tenderness.   Abdominal: Soft. Bowel sounds are normal. He exhibits no distension and no mass. There is no hepatosplenomegaly. There is no tenderness. There is no rebound and no guarding.   Musculoskeletal: He exhibits no edema, tenderness or deformity.   Neurological: He is alert and oriented to person, place, and time.   Skin: Skin is warm and dry. No rash noted. He is not diaphoretic. No cyanosis or erythema. No pallor. Nails show no clubbing.   Psychiatric: He has a normal mood and affect. His speech is normal and behavior is normal. Judgment and thought content normal.           Assessment:   1. 63-year-old gentleman with atypical chest pain some dizziness that in retrospect I believe he's had this before his ECG in reality is unchanged from before he just had a normal cardiovascular workup in September 2017 for the same things.  His enzymes are negative his ECG unchanged at this time would not pursue any further cardiovascular evaluation or treatment unless he has any more symptoms or they change.  2.  Vagal reaction responded to atropine and fluids now stable.  3.  History of prostate cancer.  4.  History of hyperlipidemia followed by PCP.         Plan:

## 2018-08-22 NOTE — PROGRESS NOTES
Christi Boothe is a 63 y.o. male.     History of Present Illness   Patient was seen today for a physical.  Patient does work outside and has a lot of dizziness and also complained of palpitations.  He does have prostate cancer and received radiation hormonal therapy.  His his lipids have been checked today with blood work.  He does have some urinary incontinence but does not want medications this time.  He did complain of palpitations and orthostatic pressures were obtained.  Lying down heart rate 62 blood pressure 140/88, standing heart rate 78 blood pressure 140/92.  He is EKG did show ST segment elevations leads 1 aVL and T-wave depressions in leads V3 through V5.  He did have a marked left axis deviation.  Cardiologist was called and he was sent to the chest pain clinic.  Approximately one hour spent with this patient.    Dictated utilizing Dragon dictation. If there are questions or for further clarification, please contact me.   The following portions of the patient's history were reviewed and updated as appropriate: allergies, current medications, past family history, past medical history, past social history, past surgical history and problem list.    Review of Systems   Constitutional: Negative for fatigue and fever.   HENT: Positive for congestion. Negative for trouble swallowing.    Eyes: Negative for discharge and visual disturbance.   Respiratory: Negative for choking and shortness of breath.    Cardiovascular: Positive for chest pain and palpitations.   Gastrointestinal: Negative for abdominal pain and blood in stool.   Endocrine: Negative.    Genitourinary: Negative for genital sores and hematuria.   Musculoskeletal: Negative for gait problem and joint swelling.   Skin: Negative for color change, pallor, rash and wound.   Allergic/Immunologic: Positive for environmental allergies. Negative for immunocompromised state.   Neurological: Positive for dizziness. Negative for facial asymmetry  and speech difficulty.   Psychiatric/Behavioral: Negative for hallucinations and suicidal ideas.       Objective   Physical Exam   Constitutional: He is oriented to person, place, and time. He appears well-developed and well-nourished. No distress.   HENT:   Head: Normocephalic and atraumatic.   Right Ear: External ear normal.   Left Ear: External ear normal.   Nose: Nose normal.   Mouth/Throat: Oropharynx is clear and moist. No oropharyngeal exudate.   Eyes: Pupils are equal, round, and reactive to light. Conjunctivae and EOM are normal. Right eye exhibits no discharge. Left eye exhibits no discharge.   Neck: Normal range of motion. Neck supple. No JVD present. No tracheal deviation present. No thyromegaly present.   Cardiovascular: Normal rate, regular rhythm and normal heart sounds.  Exam reveals no gallop and no friction rub.    No murmur heard.  Pulmonary/Chest: Effort normal and breath sounds normal. No stridor. No respiratory distress. He has no wheezes. He has no rales. He exhibits no tenderness.   Abdominal: Soft. Bowel sounds are normal. He exhibits no distension and no mass. There is no tenderness. There is no rebound and no guarding. No hernia.   Musculoskeletal: Normal range of motion. He exhibits no edema, tenderness or deformity.   Lymphadenopathy:     He has no cervical adenopathy.   Neurological: He is alert and oriented to person, place, and time. He displays normal reflexes. No cranial nerve deficit or sensory deficit. He exhibits normal muscle tone. Coordination normal.   Skin: Skin is warm and dry. No rash noted. He is not diaphoretic. No erythema. No pallor.   Psychiatric: He has a normal mood and affect. His behavior is normal. Judgment and thought content normal.   Nursing note and vitals reviewed.      Assessment/Plan   Problems Addressed this Visit        Cardiovascular and Mediastinum    Palpitations       Genitourinary    Prostate CA (CMS/HCC)    Relevant Orders    CBC & Differential  (Completed)    Comprehensive Metabolic Panel    Vitamin D 25 Hydroxy    Lipid Panel    CBC Auto Differential (Completed)       Other    Dyslipidemia    Relevant Orders    CBC & Differential (Completed)    Comprehensive Metabolic Panel    Vitamin D 25 Hydroxy    Lipid Panel    CBC Auto Differential (Completed)    Incontinence    Relevant Orders    CBC & Differential (Completed)    Comprehensive Metabolic Panel    Vitamin D 25 Hydroxy    Lipid Panel    CBC Auto Differential (Completed)      Other Visit Diagnoses     Physical exam, annual    -  Primary    Relevant Orders    CBC & Differential (Completed)    Comprehensive Metabolic Panel    Vitamin D 25 Hydroxy    Lipid Panel    CBC Auto Differential (Completed)

## 2018-08-30 ENCOUNTER — OFFICE VISIT (OUTPATIENT)
Dept: FAMILY MEDICINE CLINIC | Facility: CLINIC | Age: 63
End: 2018-08-30

## 2018-08-30 VITALS
RESPIRATION RATE: 16 BRPM | HEART RATE: 62 BPM | DIASTOLIC BLOOD PRESSURE: 80 MMHG | SYSTOLIC BLOOD PRESSURE: 130 MMHG | WEIGHT: 175 LBS | HEIGHT: 65 IN | BODY MASS INDEX: 29.16 KG/M2 | TEMPERATURE: 98.5 F | OXYGEN SATURATION: 99 %

## 2018-08-30 DIAGNOSIS — E78.5 DYSLIPIDEMIA: Primary | ICD-10-CM

## 2018-08-30 PROCEDURE — 99213 OFFICE O/P EST LOW 20 MIN: CPT | Performed by: INTERNAL MEDICINE

## 2018-08-30 RX ORDER — FLUTICASONE PROPIONATE 50 MCG
2 SPRAY, SUSPENSION (ML) NASAL DAILY
Qty: 1 BOTTLE | Refills: 3 | Status: SHIPPED | OUTPATIENT
Start: 2018-08-30 | End: 2020-05-19 | Stop reason: SDUPTHER

## 2018-08-30 RX ORDER — CETIRIZINE HYDROCHLORIDE 10 MG/1
10 TABLET ORAL 2 TIMES DAILY PRN
Qty: 60 TABLET | Refills: 3 | Status: SHIPPED | OUTPATIENT
Start: 2018-08-30 | End: 2020-05-19 | Stop reason: SDUPTHER

## 2018-08-30 RX ORDER — ATORVASTATIN CALCIUM 40 MG/1
40 TABLET, FILM COATED ORAL DAILY
Qty: 30 TABLET | Refills: 3 | Status: SHIPPED | OUTPATIENT
Start: 2018-08-30 | End: 2020-05-19 | Stop reason: SDUPTHER

## 2018-08-30 NOTE — PATIENT INSTRUCTIONS
"DASH Eating Plan  DASH stands for \"Dietary Approaches to Stop Hypertension.\" The DASH eating plan is a healthy eating plan that has been shown to reduce high blood pressure (hypertension). It may also reduce your risk for type 2 diabetes, heart disease, and stroke. The DASH eating plan may also help with weight loss.  What are tips for following this plan?  General guidelines  · Avoid eating more than 2,300 mg (milligrams) of salt (sodium) a day. If you have hypertension, you may need to reduce your sodium intake to 1,500 mg a day.  · Limit alcohol intake to no more than 1 drink a day for nonpregnant women and 2 drinks a day for men. One drink equals 12 oz of beer, 5 oz of wine, or 1½ oz of hard liquor.  · Work with your health care provider to maintain a healthy body weight or to lose weight. Ask what an ideal weight is for you.  · Get at least 30 minutes of exercise that causes your heart to beat faster (aerobic exercise) most days of the week. Activities may include walking, swimming, or biking.  · Work with your health care provider or diet and nutrition specialist (dietitian) to adjust your eating plan to your individual calorie needs.  Reading food labels  · Check food labels for the amount of sodium per serving. Choose foods with less than 5 percent of the Daily Value of sodium. Generally, foods with less than 300 mg of sodium per serving fit into this eating plan.  · To find whole grains, look for the word \"whole\" as the first word in the ingredient list.  Shopping  · Buy products labeled as \"low-sodium\" or \"no salt added.\"  · Buy fresh foods. Avoid canned foods and premade or frozen meals.  Cooking  · Avoid adding salt when cooking. Use salt-free seasonings or herbs instead of table salt or sea salt. Check with your health care provider or pharmacist before using salt substitutes.  · Do not borges foods. Cook foods using healthy methods such as baking, boiling, grilling, and broiling instead.  · Cook with " heart-healthy oils, such as olive, canola, soybean, or sunflower oil.  Meal planning    · Eat a balanced diet that includes:  ? 5 or more servings of fruits and vegetables each day. At each meal, try to fill half of your plate with fruits and vegetables.  ? Up to 6-8 servings of whole grains each day.  ? Less than 6 oz of lean meat, poultry, or fish each day. A 3-oz serving of meat is about the same size as a deck of cards. One egg equals 1 oz.  ? 2 servings of low-fat dairy each day.  ? A serving of nuts, seeds, or beans 5 times each week.  ? Heart-healthy fats. Healthy fats called Omega-3 fatty acids are found in foods such as flaxseeds and coldwater fish, like sardines, salmon, and mackerel.  · Limit how much you eat of the following:  ? Canned or prepackaged foods.  ? Food that is high in trans fat, such as fried foods.  ? Food that is high in saturated fat, such as fatty meat.  ? Sweets, desserts, sugary drinks, and other foods with added sugar.  ? Full-fat dairy products.  · Do not salt foods before eating.  · Try to eat at least 2 vegetarian meals each week.  · Eat more home-cooked food and less restaurant, buffet, and fast food.  · When eating at a restaurant, ask that your food be prepared with less salt or no salt, if possible.  What foods are recommended?  The items listed may not be a complete list. Talk with your dietitian about what dietary choices are best for you.  Grains  Whole-grain or whole-wheat bread. Whole-grain or whole-wheat pasta. Brown rice. Oatmeal. Quinoa. Bulgur. Whole-grain and low-sodium cereals. Taylor bread. Low-fat, low-sodium crackers. Whole-wheat flour tortillas.  Vegetables  Fresh or frozen vegetables (raw, steamed, roasted, or grilled). Low-sodium or reduced-sodium tomato and vegetable juice. Low-sodium or reduced-sodium tomato sauce and tomato paste. Low-sodium or reduced-sodium canned vegetables.  Fruits  All fresh, dried, or frozen fruit. Canned fruit in natural juice (without  added sugar).  Meat and other protein foods  Skinless chicken or turkey. Ground chicken or turkey. Pork with fat trimmed off. Fish and seafood. Egg whites. Dried beans, peas, or lentils. Unsalted nuts, nut butters, and seeds. Unsalted canned beans. Lean cuts of beef with fat trimmed off. Low-sodium, lean deli meat.  Dairy  Low-fat (1%) or fat-free (skim) milk. Fat-free, low-fat, or reduced-fat cheeses. Nonfat, low-sodium ricotta or cottage cheese. Low-fat or nonfat yogurt. Low-fat, low-sodium cheese.  Fats and oils  Soft margarine without trans fats. Vegetable oil. Low-fat, reduced-fat, or light mayonnaise and salad dressings (reduced-sodium). Canola, safflower, olive, soybean, and sunflower oils. Avocado.  Seasoning and other foods  Herbs. Spices. Seasoning mixes without salt. Unsalted popcorn and pretzels. Fat-free sweets.  What foods are not recommended?  The items listed may not be a complete list. Talk with your dietitian about what dietary choices are best for you.  Grains  Baked goods made with fat, such as croissants, muffins, or some breads. Dry pasta or rice meal packs.  Vegetables  Creamed or fried vegetables. Vegetables in a cheese sauce. Regular canned vegetables (not low-sodium or reduced-sodium). Regular canned tomato sauce and paste (not low-sodium or reduced-sodium). Regular tomato and vegetable juice (not low-sodium or reduced-sodium). Pickles. Olives.  Fruits  Canned fruit in a light or heavy syrup. Fried fruit. Fruit in cream or butter sauce.  Meat and other protein foods  Fatty cuts of meat. Ribs. Fried meat. Jang. Sausage. Bologna and other processed lunch meats. Salami. Fatback. Hotdogs. Bratwurst. Salted nuts and seeds. Canned beans with added salt. Canned or smoked fish. Whole eggs or egg yolks. Chicken or turkey with skin.  Dairy  Whole or 2% milk, cream, and half-and-half. Whole or full-fat cream cheese. Whole-fat or sweetened yogurt. Full-fat cheese. Nondairy creamers. Whipped toppings.  Processed cheese and cheese spreads.  Fats and oils  Butter. Stick margarine. Lard. Shortening. Ghee. Jang fat. Tropical oils, such as coconut, palm kernel, or palm oil.  Seasoning and other foods  Salted popcorn and pretzels. Onion salt, garlic salt, seasoned salt, table salt, and sea salt. Worcestershire sauce. Tartar sauce. Barbecue sauce. Teriyaki sauce. Soy sauce, including reduced-sodium. Steak sauce. Canned and packaged gravies. Fish sauce. Oyster sauce. Cocktail sauce. Horseradish that you find on the shelf. Ketchup. Mustard. Meat flavorings and tenderizers. Bouillon cubes. Hot sauce and Tabasco sauce. Premade or packaged marinades. Premade or packaged taco seasonings. Relishes. Regular salad dressings.  Where to find more information:  · National Heart, Lung, and Blood [place]: www.nhlbi.nih.gov  · American Heart Association: www.heart.org  Summary  · The DASH eating plan is a healthy eating plan that has been shown to reduce high blood pressure (hypertension). It may also reduce your risk for type 2 diabetes, heart disease, and stroke.  · With the DASH eating plan, you should limit salt (sodium) intake to 2,300 mg a day. If you have hypertension, you may need to reduce your sodium intake to 1,500 mg a day.  · When on the DASH eating plan, aim to eat more fresh fruits and vegetables, whole grains, lean proteins, low-fat dairy, and heart-healthy fats.  · Work with your health care provider or diet and nutrition specialist (dietitian) to adjust your eating plan to your individual calorie needs.  This information is not intended to replace advice given to you by your health care provider. Make sure you discuss any questions you have with your health care provider.  Document Released: 12/06/2012 Document Revised: 12/11/2017 Document Reviewed: 12/11/2017  [employer] Interactive Patient Education © 2018 [employer]

## 2018-09-10 ENCOUNTER — HOSPITAL ENCOUNTER (OUTPATIENT)
Dept: GENERAL RADIOLOGY | Facility: HOSPITAL | Age: 63
Discharge: HOME | End: 2018-09-10
Admitting: INTERNAL MEDICINE

## 2018-09-10 DIAGNOSIS — E78.5 DYSLIPIDEMIA: ICD-10-CM

## 2018-09-10 DIAGNOSIS — R13.10 DYSPHAGIA, UNSPECIFIED TYPE: Primary | ICD-10-CM

## 2018-09-10 PROCEDURE — G8996 SWALLOW CURRENT STATUS: HCPCS | Performed by: SPEECH-LANGUAGE PATHOLOGIST

## 2018-09-10 PROCEDURE — G8997 SWALLOW GOAL STATUS: HCPCS | Performed by: SPEECH-LANGUAGE PATHOLOGIST

## 2018-09-10 PROCEDURE — 74230 X-RAY XM SWLNG FUNCJ C+: CPT

## 2018-09-10 PROCEDURE — 92611 MOTION FLUOROSCOPY/SWALLOW: CPT | Performed by: SPEECH-LANGUAGE PATHOLOGIST

## 2018-09-10 PROCEDURE — G8998 SWALLOW D/C STATUS: HCPCS | Performed by: SPEECH-LANGUAGE PATHOLOGIST

## 2018-09-10 NOTE — MBS/VFSS/FEES
Outpatient Speech Language Pathology   Adult Swallow Initial Evaluation-VFSS  [facility]     Patient Name: Chet Boothe  : 1955  MRN: [id number]  Today's Date: 9/10/2018         Visit Date: 09/10/2018     SPEECH-LANGUAGE PATHOLOGY EVALUTION - VFSS  Subjective: The patient was seen on this date for a VFSS(Videofluoroscopic Swallowing Study).  Patient was alert and cooperative.    Significant history:   Objective: Risks/benefits were reviewed with the patient, and consent was obtained. The study was completed with SLP present and Radiologist review. The patient was seen in lateral and AP view(s). Textures given included thin liquid, puree consistency, mechanical soft consistency and regular consistency.  Assessment:   Pt demonstrated afunctional oropharyngeal dysphagia.  No penetration or aspiration observed with thin, purre, mixed, or solids.  No significant pharyngeal residue with any consistency.  Delayed esophageal clearance with intraesophageal backflow noted, but cleared with time.     SLP Findings: Patient presents with functional oropharyngeal dysphagia.   Recommendations: Diet Textures: thin liquid, regular consistency food. Medications should be taken as tolerated.Recommended Strategies: Upright for PO, small bites and sips and may use straw. Oral care before breakfast, after all meals and PRN.  Other Recommended Evaluations:  If symptoms continue consider GI consult and/or dedicated esophageal studies.    Dysphagia therapy is not recommended. Rationale: functional oropharyngeal dysphagia.        Patient Active Problem List   Diagnosis   • Multiple lipomas   • Pain medication agreement signed   • Prostate CA (CMS/HCC)   • Dyslipidemia   • Incontinence   • Chronic seasonal allergic rhinitis   • Palpitations        Past Medical History:   Diagnosis Date   • Multiple lipomas    • Prostate CA (CMS/HCC)         Past Surgical History:   Procedure Laterality Date   • CHOLECYSTECTOMY     • EXCISION  MASS TRUNK Bilateral 3/18/2016    Procedure: LIPOMA EXCISION, bilateral upper extremities and right flank region ;  Surgeon: Marco Lopez MD;  [place] [facility] [place] [facility] [place]  Service:    • INGUINAL HERNIA REPAIR Right    • LIPOMA EXCISION      TWICE         Visit Dx:     ICD-10-CM ICD-9-CM   1. Dysphagia, unspecified type R13.10 787.20   2. Dyslipidemia E78.5 272.4                 SLP Adult Swallow Evaluation - 09/10/18 1300        Clinical Impression    SLP Swallowing Diagnosis functional oral phase;functional pharyngeal phase  -SA    Functional Impact no impact on function  -SA    Rehab Potential/Prognosis, Swallowing good, to achieve stated therapy goals  -SA    Swallow Criteria for Skilled Therapeutic Interventions Met no problems identified which require skilled intervention  -SA       Recommendations    Therapy Frequency (Swallow) PRN  -SA    Predicted Duration Therapy Intervention (Days) until discharge  -SA    SLP Diet Recommendation regular textures;thin liquids  -SA    Recommended Precautions and Strategies upright posture during/after eating;small bites of food and sips of liquid  -SA    SLP Rec. for Method of Medication Administration as tolerated  -SA      User Key  (r) = Recorded By, (t) = Taken By, (c) = Cosigned By    Initials Name Provider Type    SA Priscilla Prieto MS CCC-SLP Speech and Language Pathologist                SLP SLC Evaluation - 09/10/18 0930        Communication Assessment/Intervention    Document Type evaluation  -SA    Patient Observations alert;cooperative  -SA    Patient Effort good  -SA    Symptoms Noted During/After Treatment none  -SA       General Information    Patient Profile Reviewed yes  -SA    Pertinent History Of Current Problem Pt c/o food sticking in chest.  -SA    Plans/Goals Discussed with patient  -SA    Barriers to Rehab none identified  -SA    Patient's Goals for Discharge patient did not state  -SA      User Key  (r) = Recorded By, (t) = Taken By, (c) = Cosigned  By    Initials Name Provider Type    SA BrittTamaPriscilla torres MS CCC-SLP Speech and Language Pathologist                          OP SLP Education     Row Name 09/10/18 1347       Education    Barriers to Learning No barriers identified  -    Education Provided Described results of evaluation;Patient expressed understanding of evaluation  -    Assessed Learning needs;Learning motivation  -SA    Learning Motivation Strong  -    Learning Method Explanation  -    Teaching Response Verbalized understanding  -SA      User Key  (r) = Recorded By, (t) = Taken By, (c) = Cosigned By    Initials Name Effective Dates    SA ShenPriscilla salas MS JELLY-SLP 03/07/18 -                     OP SLP Assessment/Plan - 09/10/18 1347        SLP Assessment    Functional Problems Swallowing  -SA    Impact on Function: Swallowing Risk of aspiration  -SA    Clinical Impression: Swallowing WNL  -SA    SLP Diagnosis functional oropharyngeal swallow  -SA    Prognosis Good (comment)  -SA    Patient/caregiver participated in establishment of treatment plan and goals Yes  -SA    Patient would benefit from skilled therapy intervention No  -SA       SLP Plan    Frequency eval only  -SA    Planned CPT's? SLP MBS: 83415  -      User Key  (r) = Recorded By, (t) = Taken By, (c) = Cosigned By    Initials Name Provider Type    SA ShenPriscilla salas MS CCC-SLP Speech and Language Pathologist              SLP Outcome Measures (last 72 hours)      SLP Outcome Measures     Row Name 09/10/18 1300             SLP Outcome Measures    Outcome Measure Used? Adult NOMS  -SA         Adult FCM Scores    FCM Chosen Swallowing  -SA      Swallowing FCM Score 7  -SA        User Key  (r) = Recorded By, (t) = Taken By, (c) = Cosigned By    Initials Name Effective Dates    SA ShenPriscilla salas MS JELLY-SLP 03/07/18 -                Time Calculation:   SLP Start Time: 0900  SLP Stop Time: 1000  SLP Time Calculation (min): 60 min    Therapy Charges for Today     Code Description  Service Date Service Provider Modifiers Qty    10270749446 HC ST SWALLOWING CURRENT STATUS 9/10/2018 Priscilla Prieto MS CCC-SLP GN, CH 1    02818873515 HC ST SWALLOWING PROJECTED 9/10/2018 Priscilla Prieto MS CCC-SLP GN, CH 1    38653129291 HC ST SWALLOWING DISCHARGE 9/10/2018 Priscilla Prieto MS CCC-SLP GN, CH 1    37738722408 HC ST MOTION FLUORO EVAL SWALLOW 5 9/10/2018 Priscilla Prieto MS CCC-SLP GN 1          SLP G-Codes  SLP NOMS Used?: Yes  Functional Limitations: Swallowing  Swallow Current Status (): 0 percent impaired, limited or restricted  Swallow Goal Status (): 0 percent impaired, limited or restricted  Swallow Discharge Status (): 0 percent impaired, limited or restricted        MS NICOLA Riley  9/10/2018

## 2018-09-11 PROBLEM — K22.4 ESOPHAGEAL DYSMOTILITY: Status: ACTIVE | Noted: 2018-09-11

## 2018-09-13 ENCOUNTER — OFFICE VISIT (OUTPATIENT)
Dept: FAMILY MEDICINE CLINIC | Facility: CLINIC | Age: 63
End: 2018-09-13

## 2018-09-13 VITALS
DIASTOLIC BLOOD PRESSURE: 62 MMHG | RESPIRATION RATE: 15 BRPM | BODY MASS INDEX: 28.49 KG/M2 | HEIGHT: 65 IN | SYSTOLIC BLOOD PRESSURE: 112 MMHG | WEIGHT: 171 LBS | TEMPERATURE: 98.5 F | OXYGEN SATURATION: 99 % | HEART RATE: 65 BPM

## 2018-09-13 DIAGNOSIS — E78.2 MIXED HYPERLIPIDEMIA: Primary | ICD-10-CM

## 2018-09-13 PROBLEM — E78.5 DYSLIPIDEMIA: Status: RESOLVED | Noted: 2017-09-13 | Resolved: 2018-09-13

## 2020-05-19 ENCOUNTER — OFFICE VISIT (OUTPATIENT)
Dept: FAMILY MEDICINE CLINIC | Facility: CLINIC | Age: 65
End: 2020-05-19

## 2020-05-19 VITALS
WEIGHT: 182.4 LBS | HEART RATE: 67 BPM | OXYGEN SATURATION: 97 % | TEMPERATURE: 98.6 F | HEIGHT: 65 IN | DIASTOLIC BLOOD PRESSURE: 82 MMHG | BODY MASS INDEX: 30.39 KG/M2 | SYSTOLIC BLOOD PRESSURE: 152 MMHG

## 2020-05-19 DIAGNOSIS — E78.2 MIXED HYPERLIPIDEMIA: ICD-10-CM

## 2020-05-19 DIAGNOSIS — Z00.00 MEDICARE ANNUAL WELLNESS VISIT, INITIAL: Primary | ICD-10-CM

## 2020-05-19 DIAGNOSIS — Z12.5 ENCOUNTER FOR SCREENING FOR MALIGNANT NEOPLASM OF PROSTATE: ICD-10-CM

## 2020-05-19 DIAGNOSIS — E55.9 VITAMIN D DEFICIENCY, UNSPECIFIED: ICD-10-CM

## 2020-05-19 DIAGNOSIS — R32 URINARY INCONTINENCE, UNSPECIFIED TYPE: ICD-10-CM

## 2020-05-19 DIAGNOSIS — C61 PROSTATE CA (HCC): ICD-10-CM

## 2020-05-19 LAB
25(OH)D3 SERPL-MCNC: 8.1 NG/ML (ref 30–100)
ALBUMIN SERPL-MCNC: 4.5 G/DL (ref 3.5–5.2)
ALBUMIN/GLOB SERPL: 1.4 G/DL
ALP SERPL-CCNC: 74 U/L (ref 39–117)
ALT SERPL W P-5'-P-CCNC: 17 U/L (ref 1–41)
ANION GAP SERPL CALCULATED.3IONS-SCNC: 10.3 MMOL/L (ref 5–15)
AST SERPL-CCNC: 20 U/L (ref 1–40)
BILIRUB SERPL-MCNC: 0.7 MG/DL (ref 0.2–1.2)
BUN BLD-MCNC: 10 MG/DL (ref 8–23)
BUN/CREAT SERPL: 9.1 (ref 7–25)
CALCIUM SPEC-SCNC: 9.8 MG/DL (ref 8.6–10.5)
CHLORIDE SERPL-SCNC: 102 MMOL/L (ref 98–107)
CHOLEST SERPL-MCNC: 190 MG/DL (ref 0–200)
CO2 SERPL-SCNC: 27.7 MMOL/L (ref 22–29)
CREAT BLD-MCNC: 1.1 MG/DL (ref 0.76–1.27)
DEPRECATED RDW RBC AUTO: 44.3 FL (ref 37–54)
ERYTHROCYTE [DISTWIDTH] IN BLOOD BY AUTOMATED COUNT: 15.3 % (ref 12.3–15.4)
GFR SERPL CREATININE-BSD FRML MDRD: 81 ML/MIN/1.73
GLOBULIN UR ELPH-MCNC: 3.3 GM/DL
GLUCOSE BLD-MCNC: 127 MG/DL (ref 65–99)
HCT VFR BLD AUTO: 44.3 % (ref 37.5–51)
HDLC SERPL-MCNC: 44 MG/DL (ref 40–60)
HGB BLD-MCNC: 15.1 G/DL (ref 13–17.7)
LDLC SERPL CALC-MCNC: 130 MG/DL (ref 0–100)
LDLC/HDLC SERPL: 2.96 {RATIO}
MCH RBC QN AUTO: 27.3 PG (ref 26.6–33)
MCHC RBC AUTO-ENTMCNC: 34.1 G/DL (ref 31.5–35.7)
MCV RBC AUTO: 80.1 FL (ref 79–97)
PLATELET # BLD AUTO: 260 10*3/MM3 (ref 140–450)
PMV BLD AUTO: 10.7 FL (ref 6–12)
POTASSIUM BLD-SCNC: 4.1 MMOL/L (ref 3.5–5.2)
PROT SERPL-MCNC: 7.8 G/DL (ref 6–8.5)
PSA SERPL-MCNC: 0.42 NG/ML (ref 0–4)
RBC # BLD AUTO: 5.53 10*6/MM3 (ref 4.14–5.8)
SODIUM BLD-SCNC: 140 MMOL/L (ref 136–145)
TRIGL SERPL-MCNC: 79 MG/DL (ref 0–150)
VLDLC SERPL-MCNC: 15.8 MG/DL (ref 5–40)
WBC NRBC COR # BLD: 7.82 10*3/MM3 (ref 3.4–10.8)

## 2020-05-19 PROCEDURE — 80053 COMPREHEN METABOLIC PANEL: CPT | Performed by: INTERNAL MEDICINE

## 2020-05-19 PROCEDURE — 85027 COMPLETE CBC AUTOMATED: CPT | Performed by: INTERNAL MEDICINE

## 2020-05-19 PROCEDURE — G0439 PPPS, SUBSEQ VISIT: HCPCS | Performed by: INTERNAL MEDICINE

## 2020-05-19 PROCEDURE — 96160 PT-FOCUSED HLTH RISK ASSMT: CPT | Performed by: INTERNAL MEDICINE

## 2020-05-19 PROCEDURE — 80061 LIPID PANEL: CPT | Performed by: INTERNAL MEDICINE

## 2020-05-19 PROCEDURE — 36415 COLL VENOUS BLD VENIPUNCTURE: CPT | Performed by: INTERNAL MEDICINE

## 2020-05-19 PROCEDURE — 82306 VITAMIN D 25 HYDROXY: CPT | Performed by: INTERNAL MEDICINE

## 2020-05-19 PROCEDURE — G0103 PSA SCREENING: HCPCS | Performed by: INTERNAL MEDICINE

## 2020-05-19 PROCEDURE — 99214 OFFICE O/P EST MOD 30 MIN: CPT | Performed by: INTERNAL MEDICINE

## 2020-05-19 RX ORDER — CETIRIZINE HYDROCHLORIDE 10 MG/1
10 TABLET ORAL DAILY
Qty: 30 TABLET | Refills: 5 | Status: SHIPPED | OUTPATIENT
Start: 2020-05-19 | End: 2021-04-13 | Stop reason: SDUPTHER

## 2020-05-19 RX ORDER — OXYBUTYNIN CHLORIDE 5 MG/1
5 TABLET ORAL DAILY
Qty: 30 TABLET | Refills: 5 | Status: SHIPPED | OUTPATIENT
Start: 2020-05-19 | End: 2021-04-13 | Stop reason: SDUPTHER

## 2020-05-19 RX ORDER — ATORVASTATIN CALCIUM 40 MG/1
40 TABLET, FILM COATED ORAL DAILY
Qty: 30 TABLET | Refills: 5 | Status: SHIPPED | OUTPATIENT
Start: 2020-05-19 | End: 2020-12-21

## 2020-05-19 RX ORDER — FLUTICASONE PROPIONATE 50 MCG
2 SPRAY, SUSPENSION (ML) NASAL DAILY
Qty: 1 BOTTLE | Refills: 5 | Status: SHIPPED | OUTPATIENT
Start: 2020-05-19 | End: 2021-04-13 | Stop reason: SDUPTHER

## 2020-05-19 NOTE — PROGRESS NOTES
Initial Medicare Wellness Visit   The ABC's of the Annual Wellness Visit    Chief Complaint   Patient presents with   • Medicare Wellness-Initial Visit       HPI:  Chet Boothe, -1955, is a 65 y.o. male who presents for an Initial Medicare Wellness Visit.  Patient was seen for Medicare wellness and visit.  Patient was seen for hyperlipidemia.  Patient is taking atorvastatin 40 mg daily, low-cholesterol diet, low impact exercise 30 minutes 3- primary care doctor and urologist.  Ays.  Patient does have a history of prostate cancer and a PSA was drawn and he will follow-up with his urologist.  Patient states over the past year has not seen doctors because he has had no insurance.  Patient has obtained insurance at present time and is following up with his urologist and primary care doctor.  Patient's also had no medications for a year and these were all refilled.  Patient does have urinary incontinence and requires oxybutynin for symptom relief.    Dictated utilizing Dragon dictation. If there are questions or for further clarification, please contact me.  Recent Hospitalizations:  Recently treated at the following:  Other: [facility].    Current Medical Providers:  Patient Care Team:  Luiz Morrison MD as PCP - General  [facility] [place] as Consulting Physician (Radiation Oncology)  Luiz Baez MD as Consulting Physician (Urology)    Health Habits and Functional and Cognitive Screening and Depression Screening:  Functional & Cognitive Status 2020   Do you have difficulty preparing food and eating? No   Do you have difficulty bathing yourself, getting dressed or grooming yourself? No   Do you have difficulty using the toilet? No   Do you have difficulty moving around from place to place? No   Do you have trouble with steps or getting out of a bed or a chair? No   Current Diet Well Balanced Diet   Dental Exam Not up to date   Eye Exam Not up to date   Exercise (times per week) 2 times per  week   Current Exercise Activities Include Walking   Do you need help using the phone?  No   Are you deaf or do you have serious difficulty hearing?  No   Do you need help with transportation? No   Do you need help shopping? No   Do you need help preparing meals?  No   Do you need help with housework?  No   Do you need help with laundry? No   Do you need help taking your medications? No   Do you need help managing money? No   Do you ever drive or ride in a car without wearing a seat belt? No   Have you felt unusual stress, anger or loneliness in the last month? No   Who do you live with? Spouse   If you need help, do you have trouble finding someone available to you? No   Have you been bothered in the last four weeks by sexual problems? No   Do you have difficulty concentrating, remembering or making decisions? No       Compared to one year ago, the patient feels his physical health is the same and his mental health is the same.    Depression Screen:  PHQ-2/PHQ-9 Depression Screening 5/19/2020   Little interest or pleasure in doing things 0   Feeling down, depressed, or hopeless 0   Trouble falling or staying asleep, or sleeping too much 0   Feeling tired or having little energy 1   Poor appetite or overeating 0   Feeling bad about yourself - or that you are a failure or have let yourself or your family down 1   Trouble concentrating on things, such as reading the newspaper or watching television 0   Moving or speaking so slowly that other people could have noticed. Or the opposite - being so fidgety or restless that you have been moving around a lot more than usual 1   Thoughts that you would be better off dead, or of hurting yourself in some way 0   Total Score 3         Past Medical/Family/Social History:  The following portions of the patient's history were reviewed and updated as appropriate: allergies, current medications, past family history, past medical history, past social history, past surgical history  and problem list.    No Known Allergies      Current Outpatient Medications:   •  atorvastatin (Lipitor) 40 MG tablet, Take 1 tablet by mouth Daily., Disp: 30 tablet, Rfl: 5  •  cetirizine (zyrTEC) 10 MG tablet, Take 1 tablet by mouth Daily., Disp: 30 tablet, Rfl: 5  •  fluticasone (Flonase) 50 MCG/ACT nasal spray, 2 sprays into the nostril(s) as directed by provider Daily., Disp: 1 bottle, Rfl: 5  •  oxybutynin (DITROPAN) 5 MG tablet, Take 1 tablet by mouth Daily., Disp: 30 tablet, Rfl: 5    Aspirin use counseling: Does not need ASA (and currently is not on it)    Current medication list contains no high risk medications.  No harmful drug interactions have been identified.     Family History   Problem Relation Age of Onset   • Heart attack Father        Social History     Tobacco Use   • Smoking status: Never Smoker   Substance Use Topics   • Alcohol use: No       Past Surgical History:   Procedure Laterality Date   • CHOLECYSTECTOMY  2016   • EXCISION MASS TRUNK Bilateral 3/18/2016    Procedure: LIPOMA EXCISION, bilateral upper extremities and right flank region ;  Surgeon: Marco Lopez MD;  [facility] [facility] [place] [facility] [place]  Service:    • INGUINAL HERNIA REPAIR Right    • LIPOMA EXCISION      TWICE       Patient Active Problem List   Diagnosis   • Multiple lipomas   • Pain medication agreement signed   • Prostate CA (CMS/HCC)   • Incontinence   • Chronic seasonal allergic rhinitis   • Palpitations   • Esophageal dysmotility   • Mixed hyperlipidemia   • Medicare annual wellness visit, subsequent       Review of Systems   Constitutional: Negative for fatigue and fever.   HENT: Positive for congestion. Negative for trouble swallowing.    Eyes: Negative for discharge and visual disturbance.   Respiratory: Negative for choking and shortness of breath.    Cardiovascular: Negative for chest pain and palpitations.   Gastrointestinal: Negative for abdominal pain and blood in stool.   Endocrine: Negative.    Genitourinary:  "Negative for genital sores and hematuria.        Urinary incontinence   Musculoskeletal: Negative for gait problem and joint swelling.   Skin: Negative for color change, pallor, rash and wound.   Allergic/Immunologic: Positive for environmental allergies. Negative for immunocompromised state.   Neurological: Negative for facial asymmetry and speech difficulty.   Psychiatric/Behavioral: Negative for hallucinations and suicidal ideas.       Objective     Vitals:    05/19/20 1052   BP: 152/82   Pulse: 67   Temp: 98.6 °F (37 °C)   SpO2: 97%   Weight: 82.7 kg (182 lb 6.4 oz)   Height: 165.1 cm (65\")       Patient's Body mass index is 30.35 kg/m². BMI is within normal parameters. No follow-up required..      No exam data present    The patient has no evidence of cognitve impairment.     Physical Exam   Constitutional: He appears well-developed and well-nourished.   HENT:   Head: Normocephalic.   Right Ear: External ear normal.   Left Ear: External ear normal.   Nose: Nose normal.   Mouth/Throat: Oropharynx is clear and moist.   Eyes: Pupils are equal, round, and reactive to light. Conjunctivae and EOM are normal.   Neck: Normal range of motion. Neck supple.   Cardiovascular: Normal rate, regular rhythm, normal heart sounds and intact distal pulses. Exam reveals no gallop and no friction rub.   No murmur heard.  Pulmonary/Chest: Breath sounds normal. No stridor. No respiratory distress. He has no wheezes. He has no rales. He exhibits no tenderness.   Abdominal: Soft. Bowel sounds are normal.   Genitourinary: Rectum normal, prostate normal and penis normal.   Musculoskeletal: Normal range of motion. He exhibits no edema, tenderness or deformity.   Neurological: He is alert.   Skin: Skin is warm and dry.   Psychiatric: He has a normal mood and affect. His behavior is normal. Judgment and thought content normal.       Recent Lab Results:     Lab Results   Component Value Date    CHOL 238 (H) 08/22/2018    TRIG 87 08/22/2018 "    HDL 50 08/22/2018    VLDL 17.4 08/22/2018    LDLHDL 3.41 08/22/2018         Assessment/Plan #1 refill of medications #2 monitor blood pressure weekly #3 labs #4 follow-up in 1 month with blood pressure readings.  Age-appropriate Screening Schedule:  Refer to the list below for future screening recommendations based on patient's age, sex and/or medical conditions.      Health Maintenance   Topic Date Due   • TDAP/TD VACCINES (1 - Tdap) 03/19/1966   • LIPID PANEL  08/22/2019   • ZOSTER VACCINE (1 of 2) 05/19/2020 (Originally 3/19/2005)   • INFLUENZA VACCINE  08/01/2020   • COLONOSCOPY  09/13/2027       Medicare Risks and Personalized Health Plan:  Advance Directive Discussion      CMS-Preventive Services Quick Reference  Medicare Preventive Services Addressed:  NA    Advance Care Planning:  ACP discussion was held with the patient during this visit. Patient does not have an advance directive, information provided.    Diagnoses and all orders for this visit:    1. Medicare annual wellness visit, initial (Primary)    2. Mixed hyperlipidemia    3. Prostate CA (CMS/HCC)  -     CBC (No Diff); Future  -     Comprehensive Metabolic Panel  -     Lipid Panel  -     PSA Screen; Future  -     Vitamin D 25 Hydroxy  -     CBC (No Diff)  -     PSA Screen    4. Encounter for screening for malignant neoplasm of prostate   -     PSA Screen; Future  -     Ambulatory Referral to Gastroenterology  -     PSA Screen    5. Vitamin D deficiency, unspecified   -     Vitamin D 25 Hydroxy    6. Urinary incontinence, unspecified type    Other orders  -     oxybutynin (DITROPAN) 5 MG tablet; Take 1 tablet by mouth Daily.  Dispense: 30 tablet; Refill: 5  -     fluticasone (Flonase) 50 MCG/ACT nasal spray; 2 sprays into the nostril(s) as directed by provider Daily.  Dispense: 1 bottle; Refill: 5  -     cetirizine (zyrTEC) 10 MG tablet; Take 1 tablet by mouth Daily.  Dispense: 30 tablet; Refill: 5  -     atorvastatin (Lipitor) 40 MG tablet; Take  1 tablet by mouth Daily.  Dispense: 30 tablet; Refill: 5        An After Visit Summary and PPPS with all of these plans were given to the patient.      Follow Up:  Return in about 4 weeks (around 6/16/2020), or if symptoms worsen or fail to improve, for Recheck.

## 2020-05-19 NOTE — PATIENT INSTRUCTIONS
Medicare Wellness  Personal Prevention Plan of Service     Date of Office Visit:  2020  Encounter Provider:  Luiz Morrison MD  Place of Service:  [facility]  Patient Name: Chet Boothe  :  1955    As part of the Medicare Wellness portion of your visit today, we are providing you with this personalized preventive plan of services (PPPS). This plan is based upon recommendations of the [place] Preventive Services Task Force (USPSTF) and the Advisory Committee on Immunization Practices (ACIP).    This lists the preventive care services that should be considered, and provides dates of when you are due. Items listed as completed are up-to-date and do not require any further intervention.    Health Maintenance   Topic Date Due   • TDAP/TD VACCINES (1 - Tdap) 1966   • HEPATITIS C SCREENING  2016   • LIPID PANEL  2019   • Pneumococcal Vaccine Once at 65 Years Old  2020   • ZOSTER VACCINE (1 of 2) 2020 (Originally 3/19/2005)   • INFLUENZA VACCINE  2020   • MEDICARE ANNUAL WELLNESS  2021   • COLONOSCOPY  2027       Orders Placed This Encounter   Procedures   • CBC (No Diff)     Standing Status:   Future     Standing Expiration Date:   2021   • Comprehensive Metabolic Panel   • Lipid Panel   • PSA Screen     Standing Status:   Future     Standing Expiration Date:   2021   • Vitamin D 25 Hydroxy   • Ambulatory Referral to Gastroenterology     Referral Priority:   Routine     Referral Type:   Consultation     Referral Reason:   Specialty Services Required     Requested Specialty:   Gastroenterology     Number of Visits Requested:   1       No follow-ups on file.

## 2020-06-05 ENCOUNTER — CLINICAL SUPPORT (OUTPATIENT)
Dept: FAMILY MEDICINE CLINIC | Facility: CLINIC | Age: 65
End: 2020-06-05

## 2020-06-05 VITALS — DIASTOLIC BLOOD PRESSURE: 98 MMHG | SYSTOLIC BLOOD PRESSURE: 162 MMHG

## 2020-12-21 RX ORDER — ATORVASTATIN CALCIUM 40 MG/1
TABLET, FILM COATED ORAL
Qty: 90 TABLET | Refills: 4 | Status: SHIPPED | OUTPATIENT
Start: 2020-12-21 | End: 2022-02-14

## 2021-04-13 ENCOUNTER — OFFICE VISIT (OUTPATIENT)
Dept: FAMILY MEDICINE CLINIC | Facility: CLINIC | Age: 66
End: 2021-04-13

## 2021-04-13 VITALS
WEIGHT: 185.8 LBS | DIASTOLIC BLOOD PRESSURE: 80 MMHG | HEART RATE: 79 BPM | BODY MASS INDEX: 30.96 KG/M2 | SYSTOLIC BLOOD PRESSURE: 144 MMHG | HEIGHT: 65 IN | OXYGEN SATURATION: 97 % | TEMPERATURE: 97.8 F

## 2021-04-13 DIAGNOSIS — E78.2 MIXED HYPERLIPIDEMIA: Primary | ICD-10-CM

## 2021-04-13 DIAGNOSIS — C61 PROSTATE CA (HCC): ICD-10-CM

## 2021-04-13 DIAGNOSIS — E55.9 VITAMIN D DEFICIENCY, UNSPECIFIED: ICD-10-CM

## 2021-04-13 LAB
25(OH)D3 SERPL-MCNC: 31.1 NG/ML
ALBUMIN SERPL-MCNC: 4.8 G/DL (ref 3.5–5.2)
ALBUMIN/GLOB SERPL: 1.5 G/DL
ALP SERPL-CCNC: 91 U/L (ref 39–117)
ALT SERPL W P-5'-P-CCNC: 23 U/L (ref 1–41)
ANION GAP SERPL CALCULATED.3IONS-SCNC: 9.1 MMOL/L (ref 5–15)
AST SERPL-CCNC: 19 U/L (ref 1–40)
BILIRUB SERPL-MCNC: 1.1 MG/DL (ref 0–1.2)
BUN SERPL-MCNC: 8 MG/DL (ref 8–23)
BUN/CREAT SERPL: 7.8 (ref 7–25)
CALCIUM SPEC-SCNC: 9.7 MG/DL (ref 8.6–10.5)
CHLORIDE SERPL-SCNC: 101 MMOL/L (ref 98–107)
CHOLEST SERPL-MCNC: 180 MG/DL (ref 0–200)
CO2 SERPL-SCNC: 28.9 MMOL/L (ref 22–29)
CREAT SERPL-MCNC: 1.02 MG/DL (ref 0.76–1.27)
DEPRECATED RDW RBC AUTO: 46.1 FL (ref 37–54)
ERYTHROCYTE [DISTWIDTH] IN BLOOD BY AUTOMATED COUNT: 15.8 % (ref 12.3–15.4)
GFR SERPL CREATININE-BSD FRML MDRD: 89 ML/MIN/1.73
GLOBULIN UR ELPH-MCNC: 3.1 GM/DL
GLUCOSE SERPL-MCNC: 122 MG/DL (ref 65–99)
HCT VFR BLD AUTO: 48 % (ref 37.5–51)
HDLC SERPL-MCNC: 49 MG/DL (ref 40–60)
HGB BLD-MCNC: 16 G/DL (ref 13–17.7)
LDLC SERPL CALC-MCNC: 115 MG/DL (ref 0–100)
LDLC/HDLC SERPL: 2.32 {RATIO}
MCH RBC QN AUTO: 27.4 PG (ref 26.6–33)
MCHC RBC AUTO-ENTMCNC: 33.3 G/DL (ref 31.5–35.7)
MCV RBC AUTO: 82.3 FL (ref 79–97)
PLATELET # BLD AUTO: 234 10*3/MM3 (ref 140–450)
PMV BLD AUTO: 10.9 FL (ref 6–12)
POTASSIUM SERPL-SCNC: 4 MMOL/L (ref 3.5–5.2)
PROT SERPL-MCNC: 7.9 G/DL (ref 6–8.5)
RBC # BLD AUTO: 5.83 10*6/MM3 (ref 4.14–5.8)
SODIUM SERPL-SCNC: 139 MMOL/L (ref 136–145)
TRIGL SERPL-MCNC: 87 MG/DL (ref 0–150)
VLDLC SERPL-MCNC: 16 MG/DL (ref 5–40)
WBC # BLD AUTO: 8.11 10*3/MM3 (ref 3.4–10.8)

## 2021-04-13 PROCEDURE — 80053 COMPREHEN METABOLIC PANEL: CPT | Performed by: INTERNAL MEDICINE

## 2021-04-13 PROCEDURE — 85027 COMPLETE CBC AUTOMATED: CPT | Performed by: INTERNAL MEDICINE

## 2021-04-13 PROCEDURE — 36415 COLL VENOUS BLD VENIPUNCTURE: CPT | Performed by: INTERNAL MEDICINE

## 2021-04-13 PROCEDURE — 82306 VITAMIN D 25 HYDROXY: CPT | Performed by: INTERNAL MEDICINE

## 2021-04-13 PROCEDURE — 80061 LIPID PANEL: CPT | Performed by: INTERNAL MEDICINE

## 2021-04-13 PROCEDURE — 99214 OFFICE O/P EST MOD 30 MIN: CPT | Performed by: INTERNAL MEDICINE

## 2021-04-13 RX ORDER — FLUTICASONE PROPIONATE 50 MCG
2 SPRAY, SUSPENSION (ML) NASAL DAILY
Qty: 18.2 ML | Refills: 3 | Status: SHIPPED | OUTPATIENT
Start: 2021-04-13 | End: 2022-05-11 | Stop reason: SDUPTHER

## 2021-04-13 RX ORDER — OXYBUTYNIN CHLORIDE 5 MG/1
5 TABLET ORAL DAILY
Qty: 30 TABLET | Refills: 5 | Status: SHIPPED | OUTPATIENT
Start: 2021-04-13 | End: 2022-03-03

## 2021-04-13 RX ORDER — CETIRIZINE HYDROCHLORIDE 10 MG/1
10 TABLET ORAL DAILY
Qty: 30 TABLET | Refills: 5 | Status: SHIPPED | OUTPATIENT
Start: 2021-04-13 | End: 2022-05-20

## 2021-04-13 RX ORDER — BLOOD PRESSURE TEST KIT
1 KIT MISCELLANEOUS DAILY
Qty: 1 EACH | Refills: 1 | Status: SHIPPED | OUTPATIENT
Start: 2021-04-13 | End: 2021-10-18 | Stop reason: SDUPTHER

## 2021-04-13 NOTE — PROGRESS NOTES
Christi Boothe is a 66 y.o. male.     Vitals:    04/13/21 1424   BP: 144/80   Pulse: 79   Temp: 97.8 °F (36.6 °C)   SpO2: 97%      Body mass index is 30.92 kg/m².     History of Present Illness   Patient being seen for hyperlipidemia.  Patient is being treated with diet exercise and atorvastatin 40 mg daily.  6 months ago his LDL was 130.  Patient is having labs drawn today we will follow-up in 4 days.  Patient did have PSA to monitor prostate status.  He does have vitamin D3 deficiency supplemented with over-the-counter D3.  Patient is using 50,000 units weekly.  Patient is having labs and follow-up in 4 days.    Dictated utilizing Dragon dictation. If there are questions or for further clarification, please contact me.  The following portions of the patient's history were reviewed and updated as appropriate: allergies, current medications, past family history, past medical history, past social history, past surgical history and problem list.    Review of Systems   Constitutional: Negative for fatigue and fever.   HENT: Positive for congestion. Negative for trouble swallowing.    Eyes: Negative for discharge and visual disturbance.   Respiratory: Negative for choking and shortness of breath.    Cardiovascular: Negative for chest pain and palpitations.   Gastrointestinal: Negative for abdominal pain and blood in stool.   Endocrine: Negative.    Genitourinary: Negative for genital sores and hematuria.   Musculoskeletal: Negative for gait problem and joint swelling.   Skin: Negative for color change, pallor, rash and wound.   Allergic/Immunologic: Positive for environmental allergies. Negative for immunocompromised state.   Neurological: Negative for facial asymmetry and speech difficulty.   Psychiatric/Behavioral: Negative for hallucinations and suicidal ideas.       Objective   Physical Exam  Vitals and nursing note reviewed.   Constitutional:       Appearance: Normal appearance. He is well-developed.    HENT:      Head: Normocephalic and atraumatic.      Nose: Nose normal.      Mouth/Throat:      Mouth: Mucous membranes are moist.      Pharynx: Oropharynx is clear.   Eyes:      Extraocular Movements: Extraocular movements intact.      Conjunctiva/sclera: Conjunctivae normal.      Pupils: Pupils are equal, round, and reactive to light.   Cardiovascular:      Rate and Rhythm: Normal rate and regular rhythm.      Heart sounds: Normal heart sounds. No murmur heard.   No friction rub. No gallop.    Pulmonary:      Effort: Pulmonary effort is normal. No respiratory distress.      Breath sounds: Normal breath sounds. No stridor. No wheezing, rhonchi or rales.   Chest:      Chest wall: No tenderness.   Abdominal:      General: Bowel sounds are normal.      Palpations: Abdomen is soft.   Musculoskeletal:         General: Normal range of motion.      Cervical back: Normal range of motion and neck supple.   Skin:     General: Skin is warm and dry.   Neurological:      General: No focal deficit present.      Mental Status: He is alert and oriented to person, place, and time. Mental status is at baseline.   Psychiatric:         Mood and Affect: Mood normal.         Behavior: Behavior normal.         Thought Content: Thought content normal.         Judgment: Judgment normal.         Assessment/Plan #1 continue atorvastatin 40 mg daily #2 labs #3 continue 50,000 units of vitamin D3 weekly.  Problems Addressed this Visit     Cardiac and Vasculature            Mixed hyperlipidemia - Primary    Relevant Orders    CBC (No Diff) (Completed)    Comprehensive Metabolic Panel (Completed)    Lipid Panel (Completed)    Vitamin D 25 Hydroxy (Completed)          Hematology and Neoplasia            Prostate CA (CMS/HCC)    Relevant Orders    CBC (No Diff) (Completed)    Comprehensive Metabolic Panel (Completed)    Lipid Panel (Completed)    Vitamin D 25 Hydroxy (Completed)            Other Visit Diagnoses     Vitamin D deficiency,  unspecified        Relevant Orders    CBC (No Diff) (Completed)    Comprehensive Metabolic Panel (Completed)    Lipid Panel (Completed)    Vitamin D 25 Hydroxy (Completed)      Diagnoses     Diagnosis Codes Comments    Mixed hyperlipidemia    -  Primary ICD-10-CM: E78.2  ICD-9-CM: 272.2     Prostate CA (CMS/HCC)     ICD-10-CM: C61  ICD-9-CM: 185     Vitamin D deficiency, unspecified     ICD-10-CM: E55.9  ICD-9-CM: 268.9

## 2021-10-18 ENCOUNTER — OFFICE VISIT (OUTPATIENT)
Dept: FAMILY MEDICINE CLINIC | Facility: CLINIC | Age: 66
End: 2021-10-18

## 2021-10-18 VITALS
TEMPERATURE: 98.9 F | DIASTOLIC BLOOD PRESSURE: 110 MMHG | HEART RATE: 74 BPM | OXYGEN SATURATION: 96 % | HEIGHT: 65 IN | SYSTOLIC BLOOD PRESSURE: 172 MMHG | WEIGHT: 193 LBS | BODY MASS INDEX: 32.15 KG/M2

## 2021-10-18 DIAGNOSIS — E78.2 MIXED HYPERLIPIDEMIA: Primary | ICD-10-CM

## 2021-10-18 DIAGNOSIS — Z12.5 PROSTATE CANCER SCREENING: ICD-10-CM

## 2021-10-18 DIAGNOSIS — E55.9 VITAMIN D DEFICIENCY, UNSPECIFIED: ICD-10-CM

## 2021-10-18 PROBLEM — I10 PRIMARY HYPERTENSION: Status: ACTIVE | Noted: 2021-10-18

## 2021-10-18 PROCEDURE — 90732 PPSV23 VACC 2 YRS+ SUBQ/IM: CPT | Performed by: INTERNAL MEDICINE

## 2021-10-18 PROCEDURE — 90662 IIV NO PRSV INCREASED AG IM: CPT | Performed by: INTERNAL MEDICINE

## 2021-10-18 PROCEDURE — G0008 ADMIN INFLUENZA VIRUS VAC: HCPCS | Performed by: INTERNAL MEDICINE

## 2021-10-18 PROCEDURE — G0009 ADMIN PNEUMOCOCCAL VACCINE: HCPCS | Performed by: INTERNAL MEDICINE

## 2021-10-18 PROCEDURE — 99213 OFFICE O/P EST LOW 20 MIN: CPT | Performed by: INTERNAL MEDICINE

## 2021-10-18 RX ORDER — BLOOD PRESSURE TEST KIT
1 KIT MISCELLANEOUS DAILY
Qty: 1 EACH | Refills: 1 | Status: SHIPPED | OUTPATIENT
Start: 2021-10-18

## 2021-10-18 RX ORDER — VERAPAMIL HYDROCHLORIDE 240 MG/1
240 TABLET, FILM COATED, EXTENDED RELEASE ORAL NIGHTLY
Qty: 30 TABLET | Refills: 3 | Status: SHIPPED | OUTPATIENT
Start: 2021-10-18

## 2021-10-18 NOTE — PROGRESS NOTES
Christi Boothe is a 66 y.o. male.     Vitals:    10/18/21 1112   BP: (Abnormal) 172/110   Pulse: 74   Temp: 98.9 °F (37.2 °C)   SpO2: 96%      Body mass index is 32.12 kg/m².     History of Present Illness   Patient was seen for hyperlipidemia.  Patient is treating it with diet exercise and atorvastatin 40 mg daily.  Patient is having labs today results pending at time of dictation.  Patient has a vitamin D3 deficiency supplement with 50,000 units weekly.  Patient's level is being drawn today.    Dictated utilizing Dragon dictation. If there are questions or for further clarification, please contact me.  The following portions of the patient's history were reviewed and updated as appropriate: allergies, current medications, past family history, past medical history, past social history, past surgical history and problem list.    Review of Systems   Constitutional: Negative for fatigue and fever.   HENT: Positive for congestion. Negative for trouble swallowing.    Eyes: Negative for discharge and visual disturbance.   Respiratory: Negative for choking and shortness of breath.    Cardiovascular: Negative for chest pain and palpitations.   Gastrointestinal: Negative for abdominal pain and blood in stool.   Endocrine: Negative.    Genitourinary: Negative for genital sores and hematuria.   Musculoskeletal: Negative for gait problem and joint swelling.   Skin: Negative for color change, pallor, rash and wound.   Allergic/Immunologic: Positive for environmental allergies. Negative for immunocompromised state.   Neurological: Negative for facial asymmetry and speech difficulty.   Psychiatric/Behavioral: Negative for hallucinations and suicidal ideas.       Objective   Physical Exam  Vitals and nursing note reviewed.   Constitutional:       Appearance: Normal appearance. He is well-developed.   HENT:      Head: Normocephalic and atraumatic.      Nose: Nose normal.      Mouth/Throat:      Mouth: Mucous membranes  are moist.      Pharynx: Oropharynx is clear.   Eyes:      Extraocular Movements: Extraocular movements intact.      Conjunctiva/sclera: Conjunctivae normal.      Pupils: Pupils are equal, round, and reactive to light.   Cardiovascular:      Rate and Rhythm: Normal rate and regular rhythm.      Heart sounds: Normal heart sounds. No murmur heard.  No friction rub. No gallop.    Pulmonary:      Effort: Pulmonary effort is normal. No respiratory distress.      Breath sounds: Normal breath sounds. No stridor. No wheezing, rhonchi or rales.   Chest:      Chest wall: No tenderness.   Abdominal:      General: Bowel sounds are normal.      Palpations: Abdomen is soft.   Musculoskeletal:         General: Normal range of motion.      Cervical back: Normal range of motion and neck supple.   Skin:     General: Skin is warm and dry.   Neurological:      General: No focal deficit present.      Mental Status: He is alert and oriented to person, place, and time. Mental status is at baseline.   Psychiatric:         Mood and Affect: Mood normal.         Behavior: Behavior normal.         Thought Content: Thought content normal.         Judgment: Judgment normal.         Assessment/Plan #1 continue vitamin D supplements #2 labs  Problems Addressed this Visit        Cardiac and Vasculature    Mixed hyperlipidemia - Primary    Relevant Orders    CBC (No Diff)    Comprehensive Metabolic Panel    Lipid Panel    PSA Screen      Other Visit Diagnoses     Prostate cancer screening        Relevant Orders    CBC (No Diff)    Comprehensive Metabolic Panel    Lipid Panel    PSA Screen    Vitamin D deficiency, unspecified        Relevant Orders    CBC (No Diff)    Comprehensive Metabolic Panel    Lipid Panel    PSA Screen      Diagnoses     Diagnosis Codes Comments    Mixed hyperlipidemia    -  Primary ICD-10-CM: E78.2  ICD-9-CM: 272.2     Prostate cancer screening     ICD-10-CM: Z12.5  ICD-9-CM: V76.44     Vitamin D deficiency, unspecified      ICD-10-CM: E55.9  ICD-9-CM: 268.9

## 2021-10-20 ENCOUNTER — CLINICAL SUPPORT (OUTPATIENT)
Dept: FAMILY MEDICINE CLINIC | Facility: CLINIC | Age: 66
End: 2021-10-20

## 2021-10-20 ENCOUNTER — LAB (OUTPATIENT)
Dept: FAMILY MEDICINE CLINIC | Facility: CLINIC | Age: 66
End: 2021-10-20

## 2021-10-20 VITALS — DIASTOLIC BLOOD PRESSURE: 78 MMHG | SYSTOLIC BLOOD PRESSURE: 158 MMHG

## 2021-10-20 DIAGNOSIS — E78.2 MIXED HYPERLIPIDEMIA: ICD-10-CM

## 2021-10-20 DIAGNOSIS — E55.9 VITAMIN D DEFICIENCY, UNSPECIFIED: ICD-10-CM

## 2021-10-20 DIAGNOSIS — Z12.5 PROSTATE CANCER SCREENING: ICD-10-CM

## 2021-10-20 LAB
ALBUMIN SERPL-MCNC: 4.5 G/DL (ref 3.5–5.2)
ALBUMIN/GLOB SERPL: 1.7 G/DL
ALP SERPL-CCNC: 95 U/L (ref 39–117)
ALT SERPL W P-5'-P-CCNC: 24 U/L (ref 1–41)
ANION GAP SERPL CALCULATED.3IONS-SCNC: 14.1 MMOL/L (ref 5–15)
AST SERPL-CCNC: 21 U/L (ref 1–40)
BILIRUB SERPL-MCNC: 0.8 MG/DL (ref 0–1.2)
BUN SERPL-MCNC: 10 MG/DL (ref 8–23)
BUN/CREAT SERPL: 9 (ref 7–25)
CALCIUM SPEC-SCNC: 9.5 MG/DL (ref 8.6–10.5)
CHLORIDE SERPL-SCNC: 102 MMOL/L (ref 98–107)
CHOLEST SERPL-MCNC: 143 MG/DL (ref 0–200)
CO2 SERPL-SCNC: 24.9 MMOL/L (ref 22–29)
CREAT SERPL-MCNC: 1.11 MG/DL (ref 0.76–1.27)
DEPRECATED RDW RBC AUTO: 44.3 FL (ref 37–54)
ERYTHROCYTE [DISTWIDTH] IN BLOOD BY AUTOMATED COUNT: 15.1 % (ref 12.3–15.4)
GFR SERPL CREATININE-BSD FRML MDRD: 80 ML/MIN/1.73
GLOBULIN UR ELPH-MCNC: 2.6 GM/DL
GLUCOSE SERPL-MCNC: 125 MG/DL (ref 65–99)
HCT VFR BLD AUTO: 47.3 % (ref 37.5–51)
HDLC SERPL-MCNC: 41 MG/DL (ref 40–60)
HGB BLD-MCNC: 15.5 G/DL (ref 13–17.7)
LDLC SERPL CALC-MCNC: 84 MG/DL (ref 0–100)
LDLC/HDLC SERPL: 2.03 {RATIO}
MCH RBC QN AUTO: 26.9 PG (ref 26.6–33)
MCHC RBC AUTO-ENTMCNC: 32.8 G/DL (ref 31.5–35.7)
MCV RBC AUTO: 82.1 FL (ref 79–97)
PLATELET # BLD AUTO: 260 10*3/MM3 (ref 140–450)
PMV BLD AUTO: 11.2 FL (ref 6–12)
POTASSIUM SERPL-SCNC: 3.9 MMOL/L (ref 3.5–5.2)
PROT SERPL-MCNC: 7.1 G/DL (ref 6–8.5)
PSA SERPL-MCNC: 0.31 NG/ML (ref 0–4)
RBC # BLD AUTO: 5.76 10*6/MM3 (ref 4.14–5.8)
SODIUM SERPL-SCNC: 141 MMOL/L (ref 136–145)
TRIGL SERPL-MCNC: 93 MG/DL (ref 0–150)
VLDLC SERPL-MCNC: 18 MG/DL (ref 5–40)
WBC # BLD AUTO: 9.86 10*3/MM3 (ref 3.4–10.8)

## 2021-10-20 PROCEDURE — 80053 COMPREHEN METABOLIC PANEL: CPT | Performed by: INTERNAL MEDICINE

## 2021-10-20 PROCEDURE — G0103 PSA SCREENING: HCPCS | Performed by: INTERNAL MEDICINE

## 2021-10-20 PROCEDURE — 85027 COMPLETE CBC AUTOMATED: CPT | Performed by: INTERNAL MEDICINE

## 2021-10-20 PROCEDURE — 80061 LIPID PANEL: CPT | Performed by: INTERNAL MEDICINE

## 2021-10-20 PROCEDURE — 36415 COLL VENOUS BLD VENIPUNCTURE: CPT | Performed by: INTERNAL MEDICINE

## 2021-10-26 ENCOUNTER — CLINICAL SUPPORT (OUTPATIENT)
Dept: FAMILY MEDICINE CLINIC | Facility: CLINIC | Age: 66
End: 2021-10-26

## 2021-10-26 VITALS — DIASTOLIC BLOOD PRESSURE: 92 MMHG | SYSTOLIC BLOOD PRESSURE: 160 MMHG | HEART RATE: 61 BPM | OXYGEN SATURATION: 99 %

## 2021-12-10 ENCOUNTER — OFFICE VISIT (OUTPATIENT)
Dept: FAMILY MEDICINE CLINIC | Facility: CLINIC | Age: 66
End: 2021-12-10

## 2021-12-10 VITALS
HEIGHT: 65 IN | HEART RATE: 79 BPM | BODY MASS INDEX: 31.96 KG/M2 | SYSTOLIC BLOOD PRESSURE: 170 MMHG | WEIGHT: 191.8 LBS | DIASTOLIC BLOOD PRESSURE: 90 MMHG | TEMPERATURE: 98 F | OXYGEN SATURATION: 99 %

## 2021-12-10 DIAGNOSIS — E78.2 MIXED HYPERLIPIDEMIA: ICD-10-CM

## 2021-12-10 DIAGNOSIS — I10 PRIMARY HYPERTENSION: ICD-10-CM

## 2021-12-10 DIAGNOSIS — R73.9 ELEVATED BLOOD SUGAR: ICD-10-CM

## 2021-12-10 DIAGNOSIS — Z00.00 MEDICARE ANNUAL WELLNESS VISIT, SUBSEQUENT: Primary | ICD-10-CM

## 2021-12-10 DIAGNOSIS — J30.2 CHRONIC SEASONAL ALLERGIC RHINITIS: ICD-10-CM

## 2021-12-10 PROCEDURE — 96160 PT-FOCUSED HLTH RISK ASSMT: CPT | Performed by: INTERNAL MEDICINE

## 2021-12-10 PROCEDURE — 1159F MED LIST DOCD IN RCRD: CPT | Performed by: INTERNAL MEDICINE

## 2021-12-10 PROCEDURE — G0439 PPPS, SUBSEQ VISIT: HCPCS | Performed by: INTERNAL MEDICINE

## 2021-12-10 PROCEDURE — 1126F AMNT PAIN NOTED NONE PRSNT: CPT | Performed by: INTERNAL MEDICINE

## 2021-12-10 PROCEDURE — 99214 OFFICE O/P EST MOD 30 MIN: CPT | Performed by: INTERNAL MEDICINE

## 2021-12-10 PROCEDURE — 1170F FXNL STATUS ASSESSED: CPT | Performed by: INTERNAL MEDICINE

## 2021-12-10 RX ORDER — VALSARTAN 160 MG/1
160 TABLET ORAL DAILY
Qty: 30 TABLET | Refills: 3 | Status: SHIPPED | OUTPATIENT
Start: 2021-12-10 | End: 2022-08-12

## 2021-12-10 NOTE — PROGRESS NOTES
QUICK REFERENCE INFORMATION:  The ABCs of the Annual Wellness Visit    Subsequent Medicare Wellness Visit patient was seen for a Medicare wellness exam.  Patient was seen for hypertension.  Systolic blood pressures been running 170s.  Patient is taking verapamil 240 mg at bedtime.  Patient had valsartan 160 mg added to the a.m.  Patient will monitor blood pressure and follow-up in 1 month.  Patient's lipids treated with diet exercise and atorvastatin 40 mg daily.  Triglycerides were 93, HDL 41, LDL 84.  Patient will continue present diet and activity levels.  Patient's blood sugar was 125 and is getting recheck BMP with hemoglobin A1c.  Patient does have chronic seasonal allergies and is using over-the-counter sinus medication.  Patient is using Flonase with relief of symptoms.    Dictated utilizing Dragon dictation. If there are questions or for further clarification, please contact me.    HEALTH RISK ASSESSMENT    1955    Recent Hospitalizations:  No hospitalization(s) within the last year..        Current Medical Providers:  Patient Care Team:  Luiz Morrison MD as PCP - Galo Nagel MD as Consulting Physician (Radiation Oncology)  Luiz Baez MD as Consulting Physician (Urology)        Smoking Status:  Social History     Tobacco Use   Smoking Status Never Smoker   Smokeless Tobacco Never Used       Alcohol Consumption:  Social History     Substance and Sexual Activity   Alcohol Use No       Depression Screen:   PHQ-2/PHQ-9 Depression Screening 12/10/2021   Little interest or pleasure in doing things 0   Feeling down, depressed, or hopeless 0   Trouble falling or staying asleep, or sleeping too much 0   Feeling tired or having little energy 0   Poor appetite or overeating 0   Feeling bad about yourself - or that you are a failure or have let yourself or your family down 0   Trouble concentrating on things, such as reading the newspaper or watching television 0   Moving or speaking so  slowly that other people could have noticed. Or the opposite - being so fidgety or restless that you have been moving around a lot more than usual 0   Thoughts that you would be better off dead, or of hurting yourself in some way 0   Total Score 0   If you checked off any problems, how difficult have these problems made it for you to do your work, take care of things at home, or get along with other people? Not difficult at all       Health Habits and Functional and Cognitive Screening:  Functional & Cognitive Status 12/10/2021   Do you have difficulty preparing food and eating? No   Do you have difficulty bathing yourself, getting dressed or grooming yourself? No   Do you have difficulty using the toilet? No   Do you have difficulty moving around from place to place? No   Do you have trouble with steps or getting out of a bed or a chair? No   Current Diet Well Balanced Diet   Dental Exam Not up to date   Eye Exam Not up to date   Exercise (times per week) 1 times per week   Current Exercises Include No Regular Exercise   Current Exercise Activities Include -   Do you need help using the phone?  No   Are you deaf or do you have serious difficulty hearing?  No   Do you need help with transportation? No   Do you need help shopping? No   Do you need help preparing meals?  No   Do you need help with housework?  No   Do you need help with laundry? No   Do you need help taking your medications? No   Do you need help managing money? No   Do you ever drive or ride in a car without wearing a seat belt? No   Have you felt unusual stress, anger or loneliness in the last month? No   Who do you live with? Alone   If you need help, do you have trouble finding someone available to you? No   Have you been bothered in the last four weeks by sexual problems? No   Do you have difficulty concentrating, remembering or making decisions? No           Does the patient have evidence of cognitive impairment? No    Aspirin use counseling:  Does not need ASA (and currently is not on it)      Recent Lab Results:  CMP:  Lab Results   Component Value Date    BUN 10 10/20/2021    CREATININE 1.11 10/20/2021    EGFRIFNONA  09/16/2016      Comment:      <15 Indicative of kidney failure.    EGFRIFAFRI 80 10/20/2021    BCR 9.0 10/20/2021     10/20/2021    K 3.9 10/20/2021    CO2 24.9 10/20/2021    CALCIUM 9.5 10/20/2021    ALBUMIN 4.50 10/20/2021    BILITOT 0.8 10/20/2021    ALKPHOS 95 10/20/2021    AST 21 10/20/2021    ALT 24 10/20/2021     Lipid Panel:  Lab Results   Component Value Date    CHOL 143 10/20/2021    TRIG 93 10/20/2021    HDL 41 10/20/2021    VLDL 18 10/20/2021    LDLHDL 2.03 10/20/2021     HbA1c:       Visual Acuity:  No exam data present    Age-appropriate Screening Schedule:  Refer to the list below for future screening recommendations based on patient's age, sex and/or medical conditions. Orders for these recommended tests are listed in the plan section. The patient has been provided with a written plan.    Health Maintenance   Topic Date Due   • TDAP/TD VACCINES (1 - Tdap) Never done   • ZOSTER VACCINE (1 of 2) Never done   • LIPID PANEL  10/20/2022   • INFLUENZA VACCINE  Completed        Subjective   History of Present Illness    Chet Boothe is a 66 y.o. male who presents for an Subsequent Wellness Visit.    The following portions of the patient's history were reviewed and updated as appropriate: allergies, current medications, past family history, past medical history, past social history, past surgical history and problem list.    Outpatient Medications Prior to Visit   Medication Sig Dispense Refill   • atorvastatin (LIPITOR) 40 MG tablet TAKE ONE TABLET BY MOUTH DAILY 90 tablet 4   • cetirizine (zyrTEC) 10 MG tablet Take 1 tablet by mouth Daily. 30 tablet 5   • Cholecalciferol 1.25 MG (27514 UT) tablet Take 50,000 Units by mouth 1 (One) Time Per Week. 12 tablet 1   • fluticasone (Flonase) 50 MCG/ACT nasal spray 2 sprays into  the nostril(s) as directed by provider Daily. 18.2 mL 3   • oxybutynin (DITROPAN) 5 MG tablet Take 1 tablet by mouth Daily. 30 tablet 5   • verapamil SR (CALAN-SR) 240 MG CR tablet Take 1 tablet by mouth Every Night. 30 tablet 3   • Blood Pressure kit 1 Device Daily. 1 each 1     No facility-administered medications prior to visit.       Patient Active Problem List   Diagnosis   • Multiple lipomas   • Pain medication agreement signed   • Prostate CA (HCC)   • Incontinence   • Chronic seasonal allergic rhinitis   • Palpitations   • Esophageal dysmotility   • Mixed hyperlipidemia   • Medicare annual wellness visit, subsequent   • Primary hypertension       Advance Care Planning:  ACP discussion was held with the patient during this visit. Patient does not have an advance directive, information provided.    Identification of Risk Factors:  Risk factors include: Advance Directive Discussion.    Review of Systems   Constitutional: Negative for fatigue and fever.   HENT: Positive for congestion. Negative for trouble swallowing.    Eyes: Negative for discharge and visual disturbance.   Respiratory: Negative for choking and shortness of breath.    Cardiovascular: Negative for chest pain and palpitations.   Gastrointestinal: Negative for abdominal pain and blood in stool.   Endocrine: Negative.    Genitourinary: Negative for genital sores and hematuria.   Musculoskeletal: Negative for gait problem and joint swelling.   Skin: Negative for color change, pallor, rash and wound.   Allergic/Immunologic: Positive for environmental allergies. Negative for immunocompromised state.   Neurological: Negative for facial asymmetry and speech difficulty.   Psychiatric/Behavioral: Negative for hallucinations and suicidal ideas.       Compared to one year ago, the patient feels his physical health is the same.  Compared to one year ago, the patient feels his mental health is the same.    Objective     Physical Exam  Vitals and nursing note  "reviewed.   Constitutional:       Appearance: Normal appearance. He is well-developed.   HENT:      Head: Normocephalic and atraumatic.      Nose: Nose normal.      Mouth/Throat:      Mouth: Mucous membranes are moist.      Pharynx: Oropharynx is clear.   Eyes:      Extraocular Movements: Extraocular movements intact.      Conjunctiva/sclera: Conjunctivae normal.      Pupils: Pupils are equal, round, and reactive to light.   Cardiovascular:      Rate and Rhythm: Normal rate and regular rhythm.      Heart sounds: Normal heart sounds. No murmur heard.  No friction rub. No gallop.    Pulmonary:      Effort: Pulmonary effort is normal. No respiratory distress.      Breath sounds: Normal breath sounds. No stridor. No wheezing, rhonchi or rales.   Chest:      Chest wall: No tenderness.   Abdominal:      General: Bowel sounds are normal.      Palpations: Abdomen is soft.   Musculoskeletal:         General: Normal range of motion.      Cervical back: Normal range of motion and neck supple.   Skin:     General: Skin is warm and dry.   Neurological:      General: No focal deficit present.      Mental Status: He is alert and oriented to person, place, and time. Mental status is at baseline.   Psychiatric:         Mood and Affect: Mood normal.         Behavior: Behavior normal.         Thought Content: Thought content normal.         Judgment: Judgment normal.         Vitals:    12/10/21 1418   BP: 170/90   BP Location: Left arm   Patient Position: Sitting   Cuff Size: Adult   Pulse: 79   Temp: 98 °F (36.7 °C)   TempSrc: Temporal   SpO2: 99%   Weight: 87 kg (191 lb 12.8 oz)   Height: 165.1 cm (65\")   PainSc: 0-No pain       Patient's Body mass index is 31.92 kg/m². indicating that he is within normal range (BMI 18.5-24.9). No BMI management plan needed..      Assessment/Plan #1 add valsartan under 60 mg daily to blood pressure regimen #2 BMP with hemoglobin A1c #3 monitor blood pressure follow-up in 1 month  Patient " Self-Management and Personalized Health Advice  The patient has been provided with information about: NA and preventive services including:   · NA.    Visit Diagnoses:    ICD-10-CM ICD-9-CM   1. Medicare annual wellness visit, subsequent  Z00.00 V70.0   2. Primary hypertension  I10 401.9   3. Mixed hyperlipidemia  E78.2 272.2   4. Chronic seasonal allergic rhinitis  J30.2 477.8   5. Elevated blood sugar  R73.9 790.29       Orders Placed This Encounter   Procedures   • Basic Metabolic Panel     Order Specific Question:   Release to patient     Answer:   Immediate   • Hemoglobin A1c     Order Specific Question:   Release to patient     Answer:   Immediate       Outpatient Encounter Medications as of 12/10/2021   Medication Sig Dispense Refill   • atorvastatin (LIPITOR) 40 MG tablet TAKE ONE TABLET BY MOUTH DAILY 90 tablet 4   • cetirizine (zyrTEC) 10 MG tablet Take 1 tablet by mouth Daily. 30 tablet 5   • Cholecalciferol 1.25 MG (26537 UT) tablet Take 50,000 Units by mouth 1 (One) Time Per Week. 12 tablet 1   • fluticasone (Flonase) 50 MCG/ACT nasal spray 2 sprays into the nostril(s) as directed by provider Daily. 18.2 mL 3   • oxybutynin (DITROPAN) 5 MG tablet Take 1 tablet by mouth Daily. 30 tablet 5   • verapamil SR (CALAN-SR) 240 MG CR tablet Take 1 tablet by mouth Every Night. 30 tablet 3   • Blood Pressure kit 1 Device Daily. 1 each 1   • valsartan (Diovan) 160 MG tablet Take 1 tablet by mouth Daily. 30 tablet 3     No facility-administered encounter medications on file as of 12/10/2021.       Reviewed use of high risk medication in the elderly: not applicable  Reviewed for potential of harmful drug interactions in the elderly: not applicable    Follow Up:  Return in about 4 weeks (around 1/7/2022), or if symptoms worsen or fail to improve, for Recheck.     An After Visit Summary and PPPS with all of these plans were given to the patient.

## 2021-12-10 NOTE — PATIENT INSTRUCTIONS
Medicare Wellness  Personal Prevention Plan of Service     Date of Office Visit:  12/10/2021  Encounter Provider:  Luiz Morrison MD  Place of Service:  [facility]  Patient Name: Chet Boothe  :  1955    As part of the Medicare Wellness portion of your visit today, we are providing you with this personalized preventive plan of services (PPPS). This plan is based upon recommendations of the [place] Preventive Services Task Force (USPSTF) and the Advisory Committee on Immunization Practices (ACIP).    This lists the preventive care services that should be considered, and provides dates of when you are due. Items listed as completed are up-to-date and do not require any further intervention.    Health Maintenance   Topic Date Due   • TDAP/TD VACCINES (1 - Tdap) Never done   • ZOSTER VACCINE (1 of 2) Never done   • HEPATITIS C SCREENING  Never done   • LIPID PANEL  10/20/2022   • ANNUAL WELLNESS VISIT  12/10/2022   • COLORECTAL CANCER SCREENING  2027   • COVID-19 Vaccine  Completed   • INFLUENZA VACCINE  Completed   • Pneumococcal Vaccine 65+  Completed       No orders of the defined types were placed in this encounter.      No follow-ups on file.

## 2021-12-13 ENCOUNTER — LAB (OUTPATIENT)
Dept: FAMILY MEDICINE CLINIC | Facility: CLINIC | Age: 66
End: 2021-12-13

## 2021-12-13 LAB
ANION GAP SERPL CALCULATED.3IONS-SCNC: 8.7 MMOL/L (ref 5–15)
BUN SERPL-MCNC: 10 MG/DL (ref 8–23)
BUN/CREAT SERPL: 8.3 (ref 7–25)
CALCIUM SPEC-SCNC: 9.9 MG/DL (ref 8.6–10.5)
CHLORIDE SERPL-SCNC: 103 MMOL/L (ref 98–107)
CO2 SERPL-SCNC: 28.3 MMOL/L (ref 22–29)
CREAT SERPL-MCNC: 1.21 MG/DL (ref 0.76–1.27)
GFR SERPL CREATININE-BSD FRML MDRD: 73 ML/MIN/1.73
GLUCOSE SERPL-MCNC: 111 MG/DL (ref 65–99)
HBA1C MFR BLD: 7.57 % (ref 4.8–5.6)
POTASSIUM SERPL-SCNC: 4.2 MMOL/L (ref 3.5–5.2)
SODIUM SERPL-SCNC: 140 MMOL/L (ref 136–145)

## 2021-12-13 PROCEDURE — 80048 BASIC METABOLIC PNL TOTAL CA: CPT | Performed by: INTERNAL MEDICINE

## 2021-12-13 PROCEDURE — 36415 COLL VENOUS BLD VENIPUNCTURE: CPT | Performed by: INTERNAL MEDICINE

## 2021-12-13 PROCEDURE — 83036 HEMOGLOBIN GLYCOSYLATED A1C: CPT | Performed by: INTERNAL MEDICINE

## 2022-01-12 ENCOUNTER — OFFICE VISIT (OUTPATIENT)
Dept: FAMILY MEDICINE CLINIC | Facility: CLINIC | Age: 67
End: 2022-01-12

## 2022-01-12 VITALS
SYSTOLIC BLOOD PRESSURE: 140 MMHG | WEIGHT: 184.2 LBS | HEART RATE: 66 BPM | RESPIRATION RATE: 14 BRPM | OXYGEN SATURATION: 97 % | HEIGHT: 65 IN | TEMPERATURE: 97.5 F | BODY MASS INDEX: 30.69 KG/M2 | DIASTOLIC BLOOD PRESSURE: 72 MMHG

## 2022-01-12 DIAGNOSIS — E78.2 MIXED HYPERLIPIDEMIA: ICD-10-CM

## 2022-01-12 DIAGNOSIS — I10 PRIMARY HYPERTENSION: ICD-10-CM

## 2022-01-12 DIAGNOSIS — E11.22 TYPE 2 DIABETES MELLITUS WITH CHRONIC KIDNEY DISEASE, WITHOUT LONG-TERM CURRENT USE OF INSULIN, UNSPECIFIED CKD STAGE: Primary | ICD-10-CM

## 2022-01-12 PROBLEM — E11.9 TYPE 2 DIABETES MELLITUS, WITHOUT LONG-TERM CURRENT USE OF INSULIN (HCC): Status: ACTIVE | Noted: 2022-01-12

## 2022-01-12 PROCEDURE — 99214 OFFICE O/P EST MOD 30 MIN: CPT | Performed by: INTERNAL MEDICINE

## 2022-01-12 RX ORDER — LANCETS 30 GAUGE
EACH MISCELLANEOUS
Qty: 100 EACH | Refills: 5 | Status: SHIPPED | OUTPATIENT
Start: 2022-01-12

## 2022-01-12 RX ORDER — BLOOD-GLUCOSE METER
1 KIT MISCELLANEOUS DAILY
Qty: 1 EACH | Refills: 0 | Status: SHIPPED | OUTPATIENT
Start: 2022-01-12

## 2022-01-12 NOTE — PATIENT INSTRUCTIONS
Diabetes Mellitus and Nutrition, Adult  When you have diabetes, or diabetes mellitus, it is very important to have healthy eating habits because your blood sugar (glucose) levels are greatly affected by what you eat and drink. Eating healthy foods in the right amounts, at about the same times every day, can help you:  · Control your blood glucose.  · Lower your risk of heart disease.  · Improve your blood pressure.  · Reach or maintain a healthy weight.  What can affect my meal plan?  Every person with diabetes is different, and each person has different needs for a meal plan. Your health care provider may recommend that you work with a dietitian to make a meal plan that is best for you. Your meal plan may vary depending on factors such as:  · The calories you need.  · The medicines you take.  · Your weight.  · Your blood glucose, blood pressure, and cholesterol levels.  · Your activity level.  · Other health conditions you have, such as heart or kidney disease.  How do carbohydrates affect me?  Carbohydrates, also called carbs, affect your blood glucose level more than any other type of food. Eating carbs naturally raises the amount of glucose in your blood. Carb counting is a method for keeping track of how many carbs you eat. Counting carbs is important to keep your blood glucose at a healthy level, especially if you use insulin or take certain oral diabetes medicines.  It is important to know how many carbs you can safely have in each meal. This is different for every person. Your dietitian can help you calculate how many carbs you should have at each meal and for each snack.  How does alcohol affect me?  Alcohol can cause a sudden decrease in blood glucose (hypoglycemia), especially if you use insulin or take certain oral diabetes medicines. Hypoglycemia can be a life-threatening condition. Symptoms of hypoglycemia, such as sleepiness, dizziness, and confusion, are similar to symptoms of having too much  "alcohol.  · Do not drink alcohol if:  ? Your health care provider tells you not to drink.  ? You are pregnant, may be pregnant, or are planning to become pregnant.  · If you drink alcohol:  ? Do not drink on an empty stomach.  ? Limit how much you use to:  § 0-1 drink a day for women.  § 0-2 drinks a day for men.  ? Be aware of how much alcohol is in your drink. In the [place] one drink equals one 12 oz bottle of beer (355 mL), one 5 oz glass of wine (148 mL), or one 1½ oz glass of hard liquor (44 mL).  ? Keep yourself hydrated with water, diet soda, or unsweetened iced tea.  § Keep in mind that regular soda, juice, and other mixers may contain a lot of sugar and must be counted as carbs.  What are tips for following this plan?    Reading food labels  · Start by checking the serving size on the \"Nutrition Facts\" label of packaged foods and drinks. The amount of calories, carbs, fats, and other nutrients listed on the label is based on one serving of the item. Many items contain more than one serving per package.  · Check the total grams (g) of carbs in one serving. You can calculate the number of servings of carbs in one serving by dividing the total carbs by 15. For example, if a food has 30 g of total carbs per serving, it would be equal to 2 servings of carbs.  · Check the number of grams (g) of saturated fats and trans fats in one serving. Choose foods that have a low amount or none of these fats.  · Check the number of milligrams (mg) of salt (sodium) in one serving. Most people should limit total sodium intake to less than 2,300 mg per day.  · Always check the nutrition information of foods labeled as \"low-fat\" or \"nonfat.\" These foods may be higher in added sugar or refined carbs and should be avoided.  · Talk to your dietitian to identify your daily goals for nutrients listed on the label.  Shopping  · Avoid buying canned, pre-made, or processed foods. These foods tend to be high in fat, sodium, and added " sugar.  · Shop around the outside edge of the grocery store. This is where you will most often find fresh fruits and vegetables, bulk grains, fresh meats, and fresh dairy.  Cooking  · Use low-heat cooking methods, such as baking, instead of high-heat cooking methods like deep frying.  · Cook using healthy oils, such as olive, canola, or sunflower oil.  · Avoid cooking with butter, cream, or high-fat meats.  Meal planning  · Eat meals and snacks regularly, preferably at the same times every day. Avoid going long periods of time without eating.  · Eat foods that are high in fiber, such as fresh fruits, vegetables, beans, and whole grains. Talk with your dietitian about how many servings of carbs you can eat at each meal.  · Eat 4-6 oz (112-168 g) of lean protein each day, such as lean meat, chicken, fish, eggs, or tofu. One ounce (oz) of lean protein is equal to:  ? 1 oz (28 g) of meat, chicken, or fish.  ? 1 egg.  ? ¼ cup (62 g) of tofu.  · Eat some foods each day that contain healthy fats, such as avocado, nuts, seeds, and fish.  What foods should I eat?  Fruits  Berries. Apples. Oranges. Peaches. Apricots. Plums. Grapes. Hamzah. Papaya. Pomegranate. Kiwi. Cherries.  Vegetables  Lettuce. Spinach. Leafy greens, including kale, chard, sierra greens, and mustard greens. Beets. Cauliflower. Cabbage. Broccoli. Carrots. Green beans. Tomatoes. Peppers. Onions. Cucumbers. [place] sprouts.  Grains  Whole grains, such as whole-wheat or whole-grain bread, crackers, tortillas, cereal, and pasta. Unsweetened oatmeal. Quinoa. Brown or wild rice.  Meats and other proteins  Seafood. Poultry without skin. Lean cuts of poultry and beef. Tofu. Nuts. Seeds.  Dairy  Low-fat or fat-free dairy products such as milk, yogurt, and cheese.  The items listed above may not be a complete list of foods and beverages you can eat. Contact a dietitian for more information.  What foods should I avoid?  Fruits  Fruits canned with  syrup.  Vegetables  Canned vegetables. Frozen vegetables with butter or cream sauce.  Grains  Refined white flour and flour products such as bread, pasta, snack foods, and cereals. Avoid all processed foods.  Meats and other proteins  Fatty cuts of meat. Poultry with skin. Breaded or fried meats. Processed meat. Avoid saturated fats.  Dairy  Full-fat yogurt, cheese, or milk.  Beverages  Sweetened drinks, such as soda or iced tea.  The items listed above may not be a complete list of foods and beverages you should avoid. Contact a dietitian for more information.  Questions to ask a health care provider  · Do I need to meet with a diabetes educator?  · Do I need to meet with a dietitian?  · What number can I call if I have questions?  · When are the best times to check my blood glucose?  Where to find more information:  · American Diabetes Association: diabetes.org  · Academy of Nutrition and Dietetics: www.eatright.org  · National [place] of Diabetes and Digestive and Kidney Diseases: www.niddk.nih.gov  · Association of Diabetes Care and Education Specialists: www.diabeteseducator.org  Summary  · It is important to have healthy eating habits because your blood sugar (glucose) levels are greatly affected by what you eat and drink.  · A healthy meal plan will help you control your blood glucose and maintain a healthy lifestyle.  · Your health care provider may recommend that you work with a dietitian to make a meal plan that is best for you.  · Keep in mind that carbohydrates (carbs) and alcohol have immediate effects on your blood glucose levels. It is important to count carbs and to use alcohol carefully.  This information is not intended to replace advice given to you by your health care provider. Make sure you discuss any questions you have with your health care provider.  Document Revised: 11/24/2020 Document Reviewed: 11/24/2020  Elsevier Patient Education © 2021 [employer]

## 2022-01-12 NOTE — PROGRESS NOTES
Subjective   Chet Boothe is a 66 y.o. male.     Vitals:    01/12/22 0915   BP: 140/72   Pulse: 66   Resp: 14   Temp: 97.5 °F (36.4 °C)   SpO2: 97%      Body mass index is 30.65 kg/m².     History of Present Illness   Patient was seen for diabetes.  Patient hemoglobin A1c was 7.5%.  Patient's sugar at that time was 111.  Patient had a meter, lancets, strips ordered from pharmacy.  Patient was put on diabetic diet and asked exercise 30 minutes 3-5 times a week.  Patient was also advised to get a diabetic eye exam.  Patient was placed on metformin 500 mg twice daily.  Patient will monitor blood sugar and follow-up in 6 weeks.  Patient's blood pressures been running 140s over 70s at home.  Patient was informed he cannot go any higher.  Patient will continue valsartan 160 mg daily with verapamil  mg daily.  Patient's lipids been treated with diet exercise and atorvastatin 40 mg daily.  HDL 41, LDL 84, triglycerides 93.  Patient will continue present treatment.    Dictated utilizing Dragon dictation. If there are questions or for further clarification, please contact me.  The following portions of the patient's history were reviewed and updated as appropriate: allergies, current medications, past family history, past medical history, past social history, past surgical history and problem list.    Review of Systems   Constitutional: Negative for fatigue and fever.   HENT: Positive for congestion. Negative for trouble swallowing.    Eyes: Negative for discharge and visual disturbance.   Respiratory: Negative for choking and shortness of breath.    Cardiovascular: Negative for chest pain and palpitations.   Gastrointestinal: Negative for abdominal pain and blood in stool.   Endocrine: Negative.    Genitourinary: Negative for genital sores and hematuria.   Musculoskeletal: Negative for gait problem and joint swelling.   Skin: Negative for color change, pallor, rash and wound.   Allergic/Immunologic: Positive for  environmental allergies. Negative for immunocompromised state.   Neurological: Negative for facial asymmetry and speech difficulty.   Psychiatric/Behavioral: Negative for hallucinations and suicidal ideas.       Objective   Physical Exam  Vitals and nursing note reviewed.   Constitutional:       Appearance: Normal appearance. He is well-developed.   HENT:      Head: Normocephalic and atraumatic.      Nose: Nose normal.      Mouth/Throat:      Mouth: Mucous membranes are moist.      Pharynx: Oropharynx is clear.   Eyes:      Extraocular Movements: Extraocular movements intact.      Conjunctiva/sclera: Conjunctivae normal.      Pupils: Pupils are equal, round, and reactive to light.   Cardiovascular:      Rate and Rhythm: Normal rate and regular rhythm.      Heart sounds: Normal heart sounds. No murmur heard.  No friction rub. No gallop.    Pulmonary:      Effort: Pulmonary effort is normal. No respiratory distress.      Breath sounds: Normal breath sounds. No stridor. No wheezing, rhonchi or rales.   Chest:      Chest wall: No tenderness.   Abdominal:      General: Bowel sounds are normal.      Palpations: Abdomen is soft.   Musculoskeletal:         General: Normal range of motion.      Cervical back: Normal range of motion and neck supple.   Skin:     General: Skin is warm and dry.   Neurological:      General: No focal deficit present.      Mental Status: He is alert and oriented to person, place, and time. Mental status is at baseline.   Psychiatric:         Mood and Affect: Mood normal.         Behavior: Behavior normal.         Thought Content: Thought content normal.         Judgment: Judgment normal.         Assessment/Plan #1 monitor blood pressure blood sugar #2 follow-up in 6 weeks #3 diabetic diet and low impact exercise 30 minutes 3-4 times a week.  #4 diabetic eye exam  Problems Addressed this Visit        Cardiac and Vasculature    Mixed hyperlipidemia    Primary hypertension       Endocrine and  Metabolic    Type 2 diabetes mellitus, without long-term current use of insulin (HCC) - Primary    Relevant Medications    metFORMIN (Glucophage) 500 MG tablet      Diagnoses     Diagnosis Codes Comments    Type 2 diabetes mellitus with chronic kidney disease, without long-term current use of insulin, unspecified CKD stage (HCC)    -  Primary ICD-10-CM: E11.22  ICD-9-CM: 250.40, 585.9     Primary hypertension     ICD-10-CM: I10  ICD-9-CM: 401.9     Mixed hyperlipidemia     ICD-10-CM: E78.2  ICD-9-CM: 272.2

## 2022-02-07 ENCOUNTER — TELEPHONE (OUTPATIENT)
Dept: FAMILY MEDICINE CLINIC | Facility: CLINIC | Age: 67
End: 2022-02-07

## 2022-02-07 NOTE — TELEPHONE ENCOUNTER
TACHO CALLING TO SEE IF PT'S RX FOR HIS DIABETIC TESTING SUPPLIES ORDER CHANGED FROM CHECKING HIS BS DAILY TO TWO TIMES A DAY?   INFORM PHARMACIST DR. PRECIADO NOT HERE ON Monday's AND SHE ASKED IF ON CALL PROVIDER COULD SEND IN 30 DAY SUPPLY SO PT COULD  TODAY, HE IS COMPLETELY OUT

## 2022-02-14 RX ORDER — ATORVASTATIN CALCIUM 40 MG/1
TABLET, FILM COATED ORAL
Qty: 90 TABLET | Refills: 4 | Status: SHIPPED | OUTPATIENT
Start: 2022-02-14

## 2022-03-03 RX ORDER — OXYBUTYNIN CHLORIDE 5 MG/1
TABLET ORAL
Qty: 30 TABLET | Refills: 5 | Status: SHIPPED | OUTPATIENT
Start: 2022-03-03

## 2022-03-31 ENCOUNTER — OFFICE VISIT (OUTPATIENT)
Dept: FAMILY MEDICINE CLINIC | Facility: CLINIC | Age: 67
End: 2022-03-31

## 2022-03-31 VITALS
HEIGHT: 65 IN | SYSTOLIC BLOOD PRESSURE: 158 MMHG | OXYGEN SATURATION: 99 % | HEART RATE: 76 BPM | TEMPERATURE: 97.5 F | WEIGHT: 181.8 LBS | BODY MASS INDEX: 30.29 KG/M2 | DIASTOLIC BLOOD PRESSURE: 80 MMHG

## 2022-03-31 DIAGNOSIS — E55.9 VITAMIN D DEFICIENCY, UNSPECIFIED: ICD-10-CM

## 2022-03-31 DIAGNOSIS — E78.2 MIXED HYPERLIPIDEMIA: ICD-10-CM

## 2022-03-31 DIAGNOSIS — I10 PRIMARY HYPERTENSION: ICD-10-CM

## 2022-03-31 DIAGNOSIS — E11.00 TYPE 2 DIABETES MELLITUS WITH HYPEROSMOLARITY WITHOUT COMA, WITHOUT LONG-TERM CURRENT USE OF INSULIN: Primary | ICD-10-CM

## 2022-03-31 PROCEDURE — 99214 OFFICE O/P EST MOD 30 MIN: CPT | Performed by: INTERNAL MEDICINE

## 2022-03-31 RX ORDER — GLIMEPIRIDE 2 MG/1
2 TABLET ORAL
Qty: 90 TABLET | Refills: 1 | Status: SHIPPED | OUTPATIENT
Start: 2022-03-31 | End: 2023-01-10

## 2022-03-31 NOTE — PROGRESS NOTES
Christi Boothe is a 67 y.o. male.     Vitals:    03/31/22 1334   BP: 158/80   Pulse: 76   Temp: 97.5 °F (36.4 °C)   SpO2: 99%      Body mass index is 30.25 kg/m².     History of Present Illness   Patient was seen for diabetes.  Blood sugar at home's are running in the 180s.  Patient had been taking metformin 500 mg twice daily.  Patient was placed on a diabetic diet next exercise 30 minutes 3-4 times a week.  Patient was placed on Amaryl 2 mg in the morning.  Patient will monitor blood sugar follow-up in 1 month.  Patient has not been checking his blood pressure at home.  Patient blood pressure in office was systolic 158.  Patient will start monitoring blood pressure and follow-up in a month.  Patient's lipids been treated with  atorvastatin 40 mg daily.  Triglycerides 93, HDL 41, LDL 84.  These labs were checked 4 months ago and is happening at recheck this week.  Patient was given a diabetic diet and will exercise 30 minutes 3-5 times a week.    Dictated utilizing Dragon dictation. If there are questions or for further clarification, please contact me.  The following portions of the patient's history were reviewed and updated as appropriate: allergies, current medications, past family history, past medical history, past social history, past surgical history and problem list.    Review of Systems   Constitutional: Negative for fatigue and fever.   HENT: Positive for congestion. Negative for trouble swallowing.    Eyes: Negative for discharge and visual disturbance.   Respiratory: Negative for choking and shortness of breath.    Cardiovascular: Negative for chest pain and palpitations.   Gastrointestinal: Negative for abdominal pain and blood in stool.   Endocrine: Negative.    Genitourinary: Negative for genital sores and hematuria.   Musculoskeletal: Negative for gait problem and joint swelling.   Skin: Negative for color change, pallor, rash and wound.   Allergic/Immunologic: Positive for  environmental allergies. Negative for immunocompromised state.   Neurological: Negative for facial asymmetry and speech difficulty.   Psychiatric/Behavioral: Negative for hallucinations and suicidal ideas.       Objective   Physical Exam  Vitals and nursing note reviewed.   Constitutional:       Appearance: Normal appearance. He is well-developed.   HENT:      Head: Normocephalic and atraumatic.      Nose: Nose normal.      Mouth/Throat:      Mouth: Mucous membranes are moist.      Pharynx: Oropharynx is clear.   Eyes:      Extraocular Movements: Extraocular movements intact.      Conjunctiva/sclera: Conjunctivae normal.      Pupils: Pupils are equal, round, and reactive to light.   Cardiovascular:      Rate and Rhythm: Normal rate and regular rhythm.      Heart sounds: Normal heart sounds. No murmur heard.    No friction rub. No gallop.   Pulmonary:      Effort: Pulmonary effort is normal. No respiratory distress.      Breath sounds: Normal breath sounds. No stridor. No wheezing, rhonchi or rales.   Chest:      Chest wall: No tenderness.   Abdominal:      General: Bowel sounds are normal.      Palpations: Abdomen is soft.   Musculoskeletal:         General: Normal range of motion.      Cervical back: Normal range of motion and neck supple.   Skin:     General: Skin is warm and dry.   Neurological:      General: No focal deficit present.      Mental Status: He is alert and oriented to person, place, and time. Mental status is at baseline.   Psychiatric:         Mood and Affect: Mood normal.         Behavior: Behavior normal.         Thought Content: Thought content normal.         Judgment: Judgment normal.         Assessment/Plan #1 diabetic diet and low impact exercise 30 minutes 3-4 times a week.  #2 Amaryl 2 mg daily.  #3 yearly eye exam #4 monitor blood pressure blood sugar follow-up in 1 month  Problems Addressed this Visit        Cardiac and Vasculature    Mixed hyperlipidemia    Relevant Orders    CBC (No  Diff)    Comprehensive Metabolic Panel    Hemoglobin A1c    Lipid Panel    Vitamin D 25 Hydroxy    Primary hypertension    Relevant Orders    CBC (No Diff)    Comprehensive Metabolic Panel    Hemoglobin A1c    Lipid Panel    Vitamin D 25 Hydroxy       Endocrine and Metabolic    Type 2 diabetes mellitus, without long-term current use of insulin (HCC) - Primary    Relevant Medications    glimepiride (Amaryl) 2 MG tablet    Other Relevant Orders    CBC (No Diff)    Comprehensive Metabolic Panel    Hemoglobin A1c    Lipid Panel    Vitamin D 25 Hydroxy      Other Visit Diagnoses     Vitamin D deficiency, unspecified         Relevant Orders    Vitamin D 25 Hydroxy      Diagnoses     Diagnosis Codes Comments    Type 2 diabetes mellitus with hyperosmolarity without coma, without long-term current use of insulin (HCC)    -  Primary ICD-10-CM: E11.00  ICD-9-CM: 250.20     Mixed hyperlipidemia     ICD-10-CM: E78.2  ICD-9-CM: 272.2     Primary hypertension     ICD-10-CM: I10  ICD-9-CM: 401.9     Vitamin D deficiency, unspecified      ICD-10-CM: E55.9  ICD-9-CM: 268.9

## 2022-05-11 ENCOUNTER — OFFICE VISIT (OUTPATIENT)
Dept: FAMILY MEDICINE CLINIC | Facility: CLINIC | Age: 67
End: 2022-05-11

## 2022-05-11 VITALS
TEMPERATURE: 97.3 F | OXYGEN SATURATION: 95 % | HEIGHT: 65 IN | SYSTOLIC BLOOD PRESSURE: 142 MMHG | DIASTOLIC BLOOD PRESSURE: 78 MMHG | HEART RATE: 73 BPM | WEIGHT: 183.4 LBS | BODY MASS INDEX: 30.56 KG/M2

## 2022-05-11 DIAGNOSIS — E55.9 VITAMIN D DEFICIENCY, UNSPECIFIED: ICD-10-CM

## 2022-05-11 DIAGNOSIS — E11.00 TYPE 2 DIABETES MELLITUS WITH HYPEROSMOLARITY WITHOUT COMA, WITHOUT LONG-TERM CURRENT USE OF INSULIN: Primary | ICD-10-CM

## 2022-05-11 DIAGNOSIS — E78.2 MIXED HYPERLIPIDEMIA: ICD-10-CM

## 2022-05-11 PROCEDURE — 99213 OFFICE O/P EST LOW 20 MIN: CPT | Performed by: INTERNAL MEDICINE

## 2022-05-11 RX ORDER — FLUTICASONE PROPIONATE 50 MCG
2 SPRAY, SUSPENSION (ML) NASAL DAILY
Qty: 18.2 ML | Refills: 3 | Status: SHIPPED | OUTPATIENT
Start: 2022-05-11

## 2022-05-11 NOTE — PROGRESS NOTES
Christi Boothe is a 67 y.o. male.     Vitals:    05/11/22 1331   BP: 142/78   Pulse: 73   Temp: 97.3 °F (36.3 °C)   SpO2: 95%      Body mass index is 30.52 kg/m².     History of Present Illness   Patient was seen for diabetes.  Patient blood sugars been running 130s to 150s.  Patient is having labs today and results pending at the time of dictation.  Patient has a vitamin D3 deficiency supplement with over-the-counter D3.  Patient is taking 50,000 units weekly.  Patient's lipids are treated with atorvastatin 40 mg daily.  Patient is having labs today and results are pending at the time of dictation.    Dictated utilizing Dragon dictation. If there are questions or for further clarification, please contact me.  The following portions of the patient's history were reviewed and updated as appropriate: allergies, current medications, past family history, past medical history, past social history, past surgical history and problem list.    Review of Systems   Constitutional: Negative for fatigue and fever.   HENT: Positive for congestion. Negative for trouble swallowing.    Eyes: Negative for discharge and visual disturbance.   Respiratory: Negative for choking and shortness of breath.    Cardiovascular: Negative for chest pain and palpitations.   Gastrointestinal: Negative for abdominal pain and blood in stool.   Endocrine: Negative.    Genitourinary: Negative for genital sores and hematuria.   Musculoskeletal: Negative for gait problem and joint swelling.   Skin: Negative for color change, pallor, rash and wound.   Allergic/Immunologic: Positive for environmental allergies. Negative for immunocompromised state.   Neurological: Negative for facial asymmetry and speech difficulty.   Psychiatric/Behavioral: Negative for hallucinations and suicidal ideas.       Objective   Physical Exam  Vitals and nursing note reviewed.   Constitutional:       Appearance: Normal appearance. He is well-developed.   HENT:       Head: Normocephalic and atraumatic.      Nose: Nose normal.      Mouth/Throat:      Mouth: Mucous membranes are moist.      Pharynx: Oropharynx is clear.   Eyes:      Extraocular Movements: Extraocular movements intact.      Conjunctiva/sclera: Conjunctivae normal.      Pupils: Pupils are equal, round, and reactive to light.   Cardiovascular:      Rate and Rhythm: Normal rate and regular rhythm.      Heart sounds: Normal heart sounds. No murmur heard.    No friction rub. No gallop.   Pulmonary:      Effort: Pulmonary effort is normal. No respiratory distress.      Breath sounds: Normal breath sounds. No stridor. No wheezing, rhonchi or rales.   Chest:      Chest wall: No tenderness.   Abdominal:      General: Bowel sounds are normal.      Palpations: Abdomen is soft.   Musculoskeletal:         General: Normal range of motion.      Cervical back: Normal range of motion and neck supple.   Skin:     General: Skin is warm and dry.   Neurological:      General: No focal deficit present.      Mental Status: He is alert and oriented to person, place, and time. Mental status is at baseline.   Psychiatric:         Mood and Affect: Mood normal.         Behavior: Behavior normal.         Thought Content: Thought content normal.         Judgment: Judgment normal.         Assessment & Plan 1 continue monitoring blood pressure blood sugar #2 labs #3 follow-up in 6 months  Problems Addressed this Visit        Cardiac and Vasculature    Mixed hyperlipidemia    Relevant Orders    CBC (No Diff)    Comprehensive Metabolic Panel    Lipid Panel    Hemoglobin A1c    Vitamin D 25 Hydroxy       Endocrine and Metabolic    Type 2 diabetes mellitus, without long-term current use of insulin (HCC) - Primary    Relevant Orders    CBC (No Diff)    Comprehensive Metabolic Panel    Lipid Panel    Hemoglobin A1c    Vitamin D 25 Hydroxy      Other Visit Diagnoses     Vitamin D deficiency, unspecified        Relevant Orders    CBC (No Diff)     Comprehensive Metabolic Panel    Lipid Panel    Hemoglobin A1c    Vitamin D 25 Hydroxy      Diagnoses     Diagnosis Codes Comments    Type 2 diabetes mellitus with hyperosmolarity without coma, without long-term current use of insulin (HCC)    -  Primary ICD-10-CM: E11.00  ICD-9-CM: 250.20     Mixed hyperlipidemia     ICD-10-CM: E78.2  ICD-9-CM: 272.2     Vitamin D deficiency, unspecified     ICD-10-CM: E55.9  ICD-9-CM: 268.9

## 2022-05-16 ENCOUNTER — LAB (OUTPATIENT)
Dept: FAMILY MEDICINE CLINIC | Facility: CLINIC | Age: 67
End: 2022-05-16

## 2022-05-16 DIAGNOSIS — E11.00 TYPE 2 DIABETES MELLITUS WITH HYPEROSMOLARITY WITHOUT COMA, WITHOUT LONG-TERM CURRENT USE OF INSULIN: ICD-10-CM

## 2022-05-16 DIAGNOSIS — E78.2 MIXED HYPERLIPIDEMIA: ICD-10-CM

## 2022-05-16 DIAGNOSIS — E55.9 VITAMIN D DEFICIENCY, UNSPECIFIED: ICD-10-CM

## 2022-05-16 DIAGNOSIS — I10 PRIMARY HYPERTENSION: ICD-10-CM

## 2022-05-16 LAB
25(OH)D3 SERPL-MCNC: 33.9 NG/ML (ref 30–100)
ALBUMIN SERPL-MCNC: 4.4 G/DL (ref 3.5–5.2)
ALBUMIN/GLOB SERPL: 1.7 G/DL
ALP SERPL-CCNC: 89 U/L (ref 39–117)
ALT SERPL W P-5'-P-CCNC: 18 U/L (ref 1–41)
ANION GAP SERPL CALCULATED.3IONS-SCNC: 9 MMOL/L (ref 5–15)
AST SERPL-CCNC: 16 U/L (ref 1–40)
BILIRUB SERPL-MCNC: 0.7 MG/DL (ref 0–1.2)
BUN SERPL-MCNC: 9 MG/DL (ref 8–23)
BUN/CREAT SERPL: 9.7 (ref 7–25)
CALCIUM SPEC-SCNC: 9.1 MG/DL (ref 8.6–10.5)
CHLORIDE SERPL-SCNC: 106 MMOL/L (ref 98–107)
CHOLEST SERPL-MCNC: 118 MG/DL (ref 0–200)
CO2 SERPL-SCNC: 27 MMOL/L (ref 22–29)
CREAT SERPL-MCNC: 0.93 MG/DL (ref 0.76–1.27)
DEPRECATED RDW RBC AUTO: 43.9 FL (ref 37–54)
EGFRCR SERPLBLD CKD-EPI 2021: 90 ML/MIN/1.73
ERYTHROCYTE [DISTWIDTH] IN BLOOD BY AUTOMATED COUNT: 15.2 % (ref 12.3–15.4)
GLOBULIN UR ELPH-MCNC: 2.6 GM/DL
GLUCOSE SERPL-MCNC: 73 MG/DL (ref 65–99)
HBA1C MFR BLD: 6.2 % (ref 4.8–5.6)
HCT VFR BLD AUTO: 42.6 % (ref 37.5–51)
HDLC SERPL-MCNC: 36 MG/DL (ref 40–60)
HGB BLD-MCNC: 14.6 G/DL (ref 13–17.7)
LDLC SERPL CALC-MCNC: 63 MG/DL (ref 0–100)
LDLC/HDLC SERPL: 1.73 {RATIO}
MCH RBC QN AUTO: 27.7 PG (ref 26.6–33)
MCHC RBC AUTO-ENTMCNC: 34.3 G/DL (ref 31.5–35.7)
MCV RBC AUTO: 80.7 FL (ref 79–97)
PLATELET # BLD AUTO: 249 10*3/MM3 (ref 140–450)
PMV BLD AUTO: 10.8 FL (ref 6–12)
POTASSIUM SERPL-SCNC: 4.1 MMOL/L (ref 3.5–5.2)
PROT SERPL-MCNC: 7 G/DL (ref 6–8.5)
RBC # BLD AUTO: 5.28 10*6/MM3 (ref 4.14–5.8)
SODIUM SERPL-SCNC: 142 MMOL/L (ref 136–145)
TRIGL SERPL-MCNC: 98 MG/DL (ref 0–150)
VLDLC SERPL-MCNC: 19 MG/DL (ref 5–40)
WBC NRBC COR # BLD: 8.92 10*3/MM3 (ref 3.4–10.8)

## 2022-05-16 PROCEDURE — 36415 COLL VENOUS BLD VENIPUNCTURE: CPT | Performed by: INTERNAL MEDICINE

## 2022-05-16 PROCEDURE — 80053 COMPREHEN METABOLIC PANEL: CPT | Performed by: INTERNAL MEDICINE

## 2022-05-16 PROCEDURE — 85027 COMPLETE CBC AUTOMATED: CPT | Performed by: INTERNAL MEDICINE

## 2022-05-16 PROCEDURE — 83036 HEMOGLOBIN GLYCOSYLATED A1C: CPT | Performed by: INTERNAL MEDICINE

## 2022-05-16 PROCEDURE — 80061 LIPID PANEL: CPT | Performed by: INTERNAL MEDICINE

## 2022-05-16 PROCEDURE — 82306 VITAMIN D 25 HYDROXY: CPT | Performed by: INTERNAL MEDICINE

## 2022-05-20 RX ORDER — CETIRIZINE HYDROCHLORIDE 10 MG/1
TABLET ORAL
Qty: 30 TABLET | Refills: 5 | Status: SHIPPED | OUTPATIENT
Start: 2022-05-20 | End: 2023-01-10

## 2022-08-12 RX ORDER — VALSARTAN 160 MG/1
TABLET ORAL
Qty: 90 TABLET | Refills: 1 | Status: SHIPPED | OUTPATIENT
Start: 2022-08-12

## 2022-11-11 ENCOUNTER — OFFICE VISIT (OUTPATIENT)
Dept: FAMILY MEDICINE CLINIC | Facility: CLINIC | Age: 67
End: 2022-11-11

## 2022-11-11 VITALS
SYSTOLIC BLOOD PRESSURE: 166 MMHG | DIASTOLIC BLOOD PRESSURE: 94 MMHG | BODY MASS INDEX: 30.86 KG/M2 | HEIGHT: 65 IN | WEIGHT: 185.2 LBS | TEMPERATURE: 98 F | HEART RATE: 79 BPM | OXYGEN SATURATION: 98 %

## 2022-11-11 DIAGNOSIS — E78.2 MIXED HYPERLIPIDEMIA: ICD-10-CM

## 2022-11-11 DIAGNOSIS — I10 PRIMARY HYPERTENSION: ICD-10-CM

## 2022-11-11 DIAGNOSIS — E11.9 TYPE 2 DIABETES MELLITUS WITHOUT COMPLICATION, WITHOUT LONG-TERM CURRENT USE OF INSULIN: Primary | ICD-10-CM

## 2022-11-11 DIAGNOSIS — E55.9 VITAMIN D DEFICIENCY, UNSPECIFIED: ICD-10-CM

## 2022-11-11 PROCEDURE — 99214 OFFICE O/P EST MOD 30 MIN: CPT | Performed by: INTERNAL MEDICINE

## 2022-11-11 NOTE — PROGRESS NOTES
"Chief Complaint  Diabetes, Hypertension, and Hyperlipidemia    Subjective        Chet Boothe presents to [facility]  History of Present Illness patient was seen for diabetes.  Patient's previous hemoglobin A1c was 6.2%.  Patient blood sugar at home been running 110.  Patient's blood pressure at home has been running 130s over 80s.  Patient will continue valsartan 160 mg daily, verapamil  mg daily.  Patient will monitor blood pressure this coming weekend and follow-up Monday with labs.  Patient will drop his numbers off.  Patient's lipids treated with diet exercise and atorvastatin 40 mg daily.  Triglycerides 98, HDL 36, LDL 63.  Patient will continue present treatment.  Patient has a vitamin D3 deficiency and is using over-the-counter D3.  She is using 50,000 units weekly.  Previous vitamin D was 33.  Patient is going to recheck them Monday.    Dictated utilizing Dragon dictation. If there are questions or for further clarification, please contact me.    Objective   Vital Signs:  Blood Pressure 166/94 (BP Location: Left arm, Patient Position: Sitting, Cuff Size: Adult)   Pulse 79   Temperature 98 °F (36.7 °C)   Height 165.1 cm (65\")   Weight 84 kg (185 lb 3.2 oz)   Oxygen Saturation 98%   Body Mass Index 30.82 kg/m²   Estimated body mass index is 30.82 kg/m² as calculated from the following:    Height as of this encounter: 165.1 cm (65\").    Weight as of this encounter: 84 kg (185 lb 3.2 oz).          Physical Exam  Vitals and nursing note reviewed.   Constitutional:       Appearance: Normal appearance. He is well-developed.   HENT:      Head: Normocephalic and atraumatic.      Nose: Nose normal.      Mouth/Throat:      Mouth: Mucous membranes are moist.      Pharynx: Oropharynx is clear.   Eyes:      Extraocular Movements: Extraocular movements intact.      Conjunctiva/sclera: Conjunctivae normal.      Pupils: Pupils are equal, round, and reactive to light. "   Cardiovascular:      Rate and Rhythm: Normal rate and regular rhythm.      Heart sounds: Normal heart sounds. No murmur heard.    No friction rub. No gallop.   Pulmonary:      Effort: Pulmonary effort is normal. No respiratory distress.      Breath sounds: Normal breath sounds. No stridor. No wheezing, rhonchi or rales.   Chest:      Chest wall: No tenderness.   Abdominal:      General: Bowel sounds are normal.      Palpations: Abdomen is soft.   Musculoskeletal:         General: Normal range of motion.      Cervical back: Normal range of motion and neck supple.   Skin:     General: Skin is warm and dry.   Neurological:      General: No focal deficit present.      Mental Status: He is alert and oriented to person, place, and time. Mental status is at baseline.   Psychiatric:         Mood and Affect: Mood normal.         Behavior: Behavior normal.         Thought Content: Thought content normal.         Judgment: Judgment normal.        Result Review :                Assessment and Plan  #1 monitor blood pressure over the weekend #2 labs on Monday and drop readings off #3 continue monitoring blood pressure blood sugar at home  Diagnoses and all orders for this visit:    1. Type 2 diabetes mellitus without complication, without long-term current use of insulin (HCC) (Primary)  -     CBC (No Diff); Future  -     Comprehensive Metabolic Panel; Future  -     Lipid Panel; Future  -     Hemoglobin A1c; Future  -     Vitamin D,25-Hydroxy; Future    2. Primary hypertension  -     CBC (No Diff); Future  -     Comprehensive Metabolic Panel; Future  -     Lipid Panel; Future  -     Hemoglobin A1c; Future  -     Vitamin D,25-Hydroxy; Future    3. Mixed hyperlipidemia  -     CBC (No Diff); Future  -     Comprehensive Metabolic Panel; Future  -     Lipid Panel; Future  -     Hemoglobin A1c; Future  -     Vitamin D,25-Hydroxy; Future    4. Vitamin D deficiency, unspecified  -     Vitamin D,25-Hydroxy; Future             Follow Up    Return in about 6 months (around 5/11/2023), or if symptoms worsen or fail to improve, for Recheck.  Patient was given instructions and counseling regarding his condition or for health maintenance advice. Please see specific information pulled into the AVS if appropriate.

## 2022-11-18 ENCOUNTER — LAB (OUTPATIENT)
Dept: FAMILY MEDICINE CLINIC | Facility: CLINIC | Age: 67
End: 2022-11-18

## 2022-11-18 DIAGNOSIS — E55.9 VITAMIN D DEFICIENCY, UNSPECIFIED: ICD-10-CM

## 2022-11-18 DIAGNOSIS — E78.2 MIXED HYPERLIPIDEMIA: ICD-10-CM

## 2022-11-18 DIAGNOSIS — I10 PRIMARY HYPERTENSION: ICD-10-CM

## 2022-11-18 DIAGNOSIS — E11.9 TYPE 2 DIABETES MELLITUS WITHOUT COMPLICATION, WITHOUT LONG-TERM CURRENT USE OF INSULIN: ICD-10-CM

## 2022-11-18 LAB
25(OH)D3 SERPL-MCNC: 34.8 NG/ML (ref 30–100)
ALBUMIN SERPL-MCNC: 4.4 G/DL (ref 3.5–5.2)
ALBUMIN/GLOB SERPL: 1.3 G/DL
ALP SERPL-CCNC: 101 U/L (ref 39–117)
ALT SERPL W P-5'-P-CCNC: 14 U/L (ref 1–41)
ANION GAP SERPL CALCULATED.3IONS-SCNC: 10.5 MMOL/L (ref 5–15)
AST SERPL-CCNC: 17 U/L (ref 1–40)
BILIRUB SERPL-MCNC: 0.9 MG/DL (ref 0–1.2)
BUN SERPL-MCNC: 7 MG/DL (ref 8–23)
BUN/CREAT SERPL: 6.3 (ref 7–25)
CALCIUM SPEC-SCNC: 10.4 MG/DL (ref 8.6–10.5)
CHLORIDE SERPL-SCNC: 103 MMOL/L (ref 98–107)
CHOLEST SERPL-MCNC: 117 MG/DL (ref 0–200)
CO2 SERPL-SCNC: 28.5 MMOL/L (ref 22–29)
CREAT SERPL-MCNC: 1.11 MG/DL (ref 0.76–1.27)
DEPRECATED RDW RBC AUTO: 46.3 FL (ref 37–54)
EGFRCR SERPLBLD CKD-EPI 2021: 72.8 ML/MIN/1.73
ERYTHROCYTE [DISTWIDTH] IN BLOOD BY AUTOMATED COUNT: 15.4 % (ref 12.3–15.4)
GLOBULIN UR ELPH-MCNC: 3.4 GM/DL
GLUCOSE SERPL-MCNC: 116 MG/DL (ref 65–99)
HBA1C MFR BLD: 6.4 % (ref 4.8–5.6)
HCT VFR BLD AUTO: 43.9 % (ref 37.5–51)
HDLC SERPL-MCNC: 46 MG/DL (ref 40–60)
HGB BLD-MCNC: 14.6 G/DL (ref 13–17.7)
LDLC SERPL CALC-MCNC: 56 MG/DL (ref 0–100)
LDLC/HDLC SERPL: 1.22 {RATIO}
MCH RBC QN AUTO: 27.2 PG (ref 26.6–33)
MCHC RBC AUTO-ENTMCNC: 33.3 G/DL (ref 31.5–35.7)
MCV RBC AUTO: 81.9 FL (ref 79–97)
PLATELET # BLD AUTO: 252 10*3/MM3 (ref 140–450)
PMV BLD AUTO: 11.7 FL (ref 6–12)
POTASSIUM SERPL-SCNC: 4.4 MMOL/L (ref 3.5–5.2)
PROT SERPL-MCNC: 7.8 G/DL (ref 6–8.5)
RBC # BLD AUTO: 5.36 10*6/MM3 (ref 4.14–5.8)
SODIUM SERPL-SCNC: 142 MMOL/L (ref 136–145)
TRIGL SERPL-MCNC: 74 MG/DL (ref 0–150)
VLDLC SERPL-MCNC: 15 MG/DL (ref 5–40)
WBC NRBC COR # BLD: 7.56 10*3/MM3 (ref 3.4–10.8)

## 2022-11-18 PROCEDURE — 82306 VITAMIN D 25 HYDROXY: CPT | Performed by: INTERNAL MEDICINE

## 2022-11-18 PROCEDURE — 80061 LIPID PANEL: CPT | Performed by: INTERNAL MEDICINE

## 2022-11-18 PROCEDURE — 36415 COLL VENOUS BLD VENIPUNCTURE: CPT | Performed by: INTERNAL MEDICINE

## 2022-11-18 PROCEDURE — 85027 COMPLETE CBC AUTOMATED: CPT | Performed by: INTERNAL MEDICINE

## 2022-11-18 PROCEDURE — 80053 COMPREHEN METABOLIC PANEL: CPT | Performed by: INTERNAL MEDICINE

## 2022-11-18 PROCEDURE — 83036 HEMOGLOBIN GLYCOSYLATED A1C: CPT | Performed by: INTERNAL MEDICINE

## 2023-01-10 RX ORDER — CETIRIZINE HYDROCHLORIDE 10 MG/1
TABLET ORAL
Qty: 30 TABLET | Refills: 5 | Status: SHIPPED | OUTPATIENT
Start: 2023-01-10

## 2023-01-10 RX ORDER — GLIMEPIRIDE 2 MG/1
TABLET ORAL
Qty: 90 TABLET | Refills: 1 | Status: SHIPPED | OUTPATIENT
Start: 2023-01-10

## 2023-04-07 ENCOUNTER — OFFICE VISIT (OUTPATIENT)
Dept: FAMILY MEDICINE CLINIC | Facility: CLINIC | Age: 68
End: 2023-04-07
Payer: MEDICARE

## 2023-04-07 VITALS
SYSTOLIC BLOOD PRESSURE: 162 MMHG | HEART RATE: 73 BPM | HEIGHT: 65 IN | WEIGHT: 187 LBS | BODY MASS INDEX: 31.16 KG/M2 | OXYGEN SATURATION: 97 % | DIASTOLIC BLOOD PRESSURE: 90 MMHG | TEMPERATURE: 97.8 F

## 2023-04-07 DIAGNOSIS — I10 PRIMARY HYPERTENSION: Primary | ICD-10-CM

## 2023-04-07 DIAGNOSIS — E11.65 TYPE 2 DIABETES MELLITUS WITH HYPERGLYCEMIA, WITHOUT LONG-TERM CURRENT USE OF INSULIN: ICD-10-CM

## 2023-04-07 DIAGNOSIS — E78.2 MIXED HYPERLIPIDEMIA: ICD-10-CM

## 2023-04-07 PROCEDURE — 1160F RVW MEDS BY RX/DR IN RCRD: CPT | Performed by: INTERNAL MEDICINE

## 2023-04-07 PROCEDURE — 3077F SYST BP >= 140 MM HG: CPT | Performed by: INTERNAL MEDICINE

## 2023-04-07 PROCEDURE — 99214 OFFICE O/P EST MOD 30 MIN: CPT | Performed by: INTERNAL MEDICINE

## 2023-04-07 PROCEDURE — 3080F DIAST BP >= 90 MM HG: CPT | Performed by: INTERNAL MEDICINE

## 2023-04-07 PROCEDURE — 1159F MED LIST DOCD IN RCRD: CPT | Performed by: INTERNAL MEDICINE

## 2023-04-07 RX ORDER — HYDRALAZINE HYDROCHLORIDE 25 MG/1
25 TABLET, FILM COATED ORAL 3 TIMES DAILY
Qty: 90 TABLET | Refills: 3 | Status: SHIPPED | OUTPATIENT
Start: 2023-04-07

## 2023-04-07 RX ORDER — LANCETS 30 GAUGE
EACH MISCELLANEOUS
Qty: 100 EACH | Refills: 5 | Status: SHIPPED | OUTPATIENT
Start: 2023-04-07

## 2023-04-07 NOTE — PROGRESS NOTES
"Chief Complaint  Diabetes, 6 month f/u, and Hypertension (Didn't take medicine)    Subjective        Chet Boothe presents to [facility]  History of Present Illness patient was seen for hypertension.  Pressures been running 160s over 70s.  Patient is taking verapamil 240 mg daily, valsartan 160 mg daily.  Patient had had hydralazine 25 mg 3 times daily added to regimen.  Patient will continue other medications.  Patient will monitor blood pressure follow-up in 3 months.  Patient's blood sugars been running in the 130s.  Patient's hemoglobin A1c was 6.4%.  Patient's lipids treated with diet exercise and atorvastatin 40 mg daily.  Triglycerides 74, HDL 46, LDLs 56.  Labs are 4 months old and have it rechecked.    Dictated utilizing Dragon dictation. If there are questions or for further clarification, please contact me.    Objective   Vital Signs:  Blood Pressure 162/90   Pulse 73   Temperature 97.8 °F (36.6 °C)   Height 165.1 cm (65\")   Weight 84.8 kg (187 lb)   Oxygen Saturation 97%   Body Mass Index 31.12 kg/m²   Estimated body mass index is 31.12 kg/m² as calculated from the following:    Height as of this encounter: 165.1 cm (65\").    Weight as of this encounter: 84.8 kg (187 lb).             Physical Exam  Vitals and nursing note reviewed.   Constitutional:       Appearance: Normal appearance. He is well-developed.   HENT:      Head: Normocephalic and atraumatic.      Nose: Nose normal.      Mouth/Throat:      Mouth: Mucous membranes are moist.      Pharynx: Oropharynx is clear.   Eyes:      Extraocular Movements: Extraocular movements intact.      Conjunctiva/sclera: Conjunctivae normal.      Pupils: Pupils are equal, round, and reactive to light.   Cardiovascular:      Rate and Rhythm: Normal rate and regular rhythm.      Heart sounds: Normal heart sounds. No murmur heard.    No friction rub. No gallop.   Pulmonary:      Effort: Pulmonary effort is normal. No respiratory " distress.      Breath sounds: Normal breath sounds. No stridor. No wheezing, rhonchi or rales.   Chest:      Chest wall: No tenderness.   Abdominal:      General: Bowel sounds are normal.      Palpations: Abdomen is soft.   Musculoskeletal:         General: Normal range of motion.      Cervical back: Normal range of motion and neck supple.   Skin:     General: Skin is warm and dry.   Neurological:      General: No focal deficit present.      Mental Status: He is alert and oriented to person, place, and time. Mental status is at baseline.   Psychiatric:         Mood and Affect: Mood normal.         Behavior: Behavior normal.         Thought Content: Thought content normal.         Judgment: Judgment normal.        Result Review :                   Assessment and Plan  #1 continue monitoring blood pressure blood sugar #2 labs #3 add hydralazine 25 mg 3 times daily to blood pressure regimen #4 follow-up in 3 months  Diagnoses and all orders for this visit:    1. Primary hypertension (Primary)    2. Mixed hyperlipidemia    3. Type 2 diabetes mellitus with hyperglycemia, without long-term current use of insulin    Other orders  -     Lancets misc; Twice daily accu-chek fast clix  E 11.9  Dispense: 100 each; Refill: 5  -     hydrALAZINE (APRESOLINE) 25 MG tablet; Take 1 tablet by mouth 3 (Three) Times a Day.  Dispense: 90 tablet; Refill: 3             Follow Up   Return in about 3 months (around 7/7/2023), or if symptoms worsen or fail to improve, for Recheck.  Patient was given instructions and counseling regarding his condition or for health maintenance advice. Please see specific information pulled into the AVS if appropriate.

## 2025-03-17 NOTE — PROGRESS NOTES
----- Message from Susanna PRO Shanda resides at [facility] as of Monday 2/17/25.  Please fax Warfarin dosing instructions to [phone].   Send Warfarin Rx to [facility].   Christi Boothe is a 63 y.o. male.     History of Present Illness   Patient was seen for hyperlipidemia.  His LDL was 174.  He was given Lipitor and asked to follow-up in one in 3 months.  She was also given a low-cholesterol diet and exercise exercise 30 minutes 3-5 times a week.    Dictated utilizing Dragon dictation. If there are questions or for further clarification, please contact me.   The following portions of the patient's history were reviewed and updated as appropriate: allergies, current medications, past family history, past medical history, past social history, past surgical history and problem list.    Review of Systems   Constitutional: Negative for fatigue and fever.   HENT: Positive for congestion. Negative for trouble swallowing.    Eyes: Negative for discharge and visual disturbance.   Respiratory: Negative for choking and shortness of breath.    Cardiovascular: Negative for chest pain and palpitations.   Gastrointestinal: Negative for abdominal pain and blood in stool.   Endocrine: Negative.    Genitourinary: Negative for genital sores and hematuria.   Musculoskeletal: Negative for gait problem and joint swelling.   Skin: Negative for color change, pallor, rash and wound.   Allergic/Immunologic: Positive for environmental allergies. Negative for immunocompromised state.   Neurological: Negative for facial asymmetry and speech difficulty.   Psychiatric/Behavioral: Negative for hallucinations and suicidal ideas.       Objective   Physical Exam   Constitutional: He is oriented to person, place, and time. He appears well-developed and well-nourished.   HENT:   Head: Normocephalic.   Eyes: Pupils are equal, round, and reactive to light. Conjunctivae are normal.   Neck: Normal range of motion. Neck supple.   Cardiovascular: Normal rate, regular rhythm and normal heart sounds.    Pulmonary/Chest: Effort normal and breath sounds normal.   Abdominal: Soft. Bowel sounds are normal.    Musculoskeletal: Normal range of motion.   Neurological: He is alert and oriented to person, place, and time.   Skin: Skin is warm and dry.   Psychiatric: He has a normal mood and affect. His behavior is normal. Judgment and thought content normal.   Nursing note and vitals reviewed.      Assessment/Plan   Problems Addressed this Visit        Other    Dyslipidemia - Primary    Relevant Orders    FL Video Swallow    Hepatic Function Panel    Comprehensive Metabolic Panel    Lipid Panel